# Patient Record
Sex: FEMALE | Race: WHITE | NOT HISPANIC OR LATINO | URBAN - METROPOLITAN AREA
[De-identification: names, ages, dates, MRNs, and addresses within clinical notes are randomized per-mention and may not be internally consistent; named-entity substitution may affect disease eponyms.]

---

## 2017-01-03 ENCOUNTER — COMMUNICATION - HEALTHEAST (OUTPATIENT)
Dept: FAMILY MEDICINE | Facility: CLINIC | Age: 67
End: 2017-01-03

## 2017-01-03 DIAGNOSIS — R52 PAIN: ICD-10-CM

## 2017-02-01 ENCOUNTER — COMMUNICATION - HEALTHEAST (OUTPATIENT)
Dept: FAMILY MEDICINE | Facility: CLINIC | Age: 67
End: 2017-02-01

## 2017-02-01 DIAGNOSIS — R52 PAIN: ICD-10-CM

## 2017-03-02 ENCOUNTER — COMMUNICATION - HEALTHEAST (OUTPATIENT)
Dept: FAMILY MEDICINE | Facility: CLINIC | Age: 67
End: 2017-03-02

## 2017-03-02 DIAGNOSIS — R52 PAIN: ICD-10-CM

## 2017-04-04 ENCOUNTER — COMMUNICATION - HEALTHEAST (OUTPATIENT)
Dept: FAMILY MEDICINE | Facility: CLINIC | Age: 67
End: 2017-04-04

## 2017-04-04 DIAGNOSIS — R52 PAIN: ICD-10-CM

## 2017-04-13 ENCOUNTER — COMMUNICATION - HEALTHEAST (OUTPATIENT)
Dept: FAMILY MEDICINE | Facility: CLINIC | Age: 67
End: 2017-04-13

## 2017-04-13 DIAGNOSIS — E78.5 HYPERLIPIDEMIA: ICD-10-CM

## 2017-04-18 ENCOUNTER — COMMUNICATION - HEALTHEAST (OUTPATIENT)
Dept: FAMILY MEDICINE | Facility: CLINIC | Age: 67
End: 2017-04-18

## 2017-04-19 ENCOUNTER — COMMUNICATION - HEALTHEAST (OUTPATIENT)
Dept: FAMILY MEDICINE | Facility: CLINIC | Age: 67
End: 2017-04-19

## 2017-04-19 DIAGNOSIS — R52 PAIN: ICD-10-CM

## 2017-04-20 ENCOUNTER — COMMUNICATION - HEALTHEAST (OUTPATIENT)
Dept: FAMILY MEDICINE | Facility: CLINIC | Age: 67
End: 2017-04-20

## 2017-05-08 ENCOUNTER — COMMUNICATION - HEALTHEAST (OUTPATIENT)
Dept: FAMILY MEDICINE | Facility: CLINIC | Age: 67
End: 2017-05-08

## 2017-05-08 DIAGNOSIS — I10 HYPERTENSION: ICD-10-CM

## 2017-05-08 DIAGNOSIS — R52 PAIN: ICD-10-CM

## 2017-06-08 ENCOUNTER — COMMUNICATION - HEALTHEAST (OUTPATIENT)
Dept: FAMILY MEDICINE | Facility: CLINIC | Age: 67
End: 2017-06-08

## 2017-06-08 DIAGNOSIS — R52 PAIN: ICD-10-CM

## 2017-06-08 DIAGNOSIS — I10 ESSENTIAL HYPERTENSION: ICD-10-CM

## 2017-06-12 ENCOUNTER — COMMUNICATION - HEALTHEAST (OUTPATIENT)
Dept: FAMILY MEDICINE | Facility: CLINIC | Age: 67
End: 2017-06-12

## 2017-06-12 ENCOUNTER — OFFICE VISIT - HEALTHEAST (OUTPATIENT)
Dept: FAMILY MEDICINE | Facility: CLINIC | Age: 67
End: 2017-06-12

## 2017-06-12 DIAGNOSIS — R52 PAIN: ICD-10-CM

## 2017-06-12 ASSESSMENT — MIFFLIN-ST. JEOR: SCORE: 1049.32

## 2017-06-13 ENCOUNTER — COMMUNICATION - HEALTHEAST (OUTPATIENT)
Dept: FAMILY MEDICINE | Facility: CLINIC | Age: 67
End: 2017-06-13

## 2017-06-13 DIAGNOSIS — R52 PAIN: ICD-10-CM

## 2017-06-14 ENCOUNTER — COMMUNICATION - HEALTHEAST (OUTPATIENT)
Dept: FAMILY MEDICINE | Facility: CLINIC | Age: 67
End: 2017-06-14

## 2017-06-14 DIAGNOSIS — R52 PAIN: ICD-10-CM

## 2017-07-04 ENCOUNTER — COMMUNICATION - HEALTHEAST (OUTPATIENT)
Dept: FAMILY MEDICINE | Facility: CLINIC | Age: 67
End: 2017-07-04

## 2017-07-04 DIAGNOSIS — E78.5 HYPERLIPIDEMIA: ICD-10-CM

## 2017-07-07 ENCOUNTER — COMMUNICATION - HEALTHEAST (OUTPATIENT)
Dept: NURSING | Facility: CLINIC | Age: 67
End: 2017-07-07

## 2017-07-13 ENCOUNTER — RECORDS - HEALTHEAST (OUTPATIENT)
Dept: GENERAL RADIOLOGY | Facility: CLINIC | Age: 67
End: 2017-07-13

## 2017-07-13 ENCOUNTER — OFFICE VISIT - HEALTHEAST (OUTPATIENT)
Dept: FAMILY MEDICINE | Facility: CLINIC | Age: 67
End: 2017-07-13

## 2017-07-13 DIAGNOSIS — Z79.899 CONTROLLED SUBSTANCE AGREEMENT SIGNED: ICD-10-CM

## 2017-07-13 DIAGNOSIS — E78.5 HYPERLIPIDEMIA: ICD-10-CM

## 2017-07-13 DIAGNOSIS — E11.9 DIET-CONTROLLED TYPE 2 DIABETES MELLITUS (H): ICD-10-CM

## 2017-07-13 DIAGNOSIS — M81.0 OSTEOPOROSIS: ICD-10-CM

## 2017-07-13 DIAGNOSIS — F17.200 TOBACCO USE DISORDER: ICD-10-CM

## 2017-07-13 DIAGNOSIS — I10 ESSENTIAL HYPERTENSION: ICD-10-CM

## 2017-07-13 DIAGNOSIS — M79.674 PAIN IN RIGHT TOE(S): ICD-10-CM

## 2017-07-13 DIAGNOSIS — S92.912A TOE FRACTURE, LEFT: ICD-10-CM

## 2017-07-13 DIAGNOSIS — E11.9 TYPE 2 DIABETES MELLITUS WITHOUT COMPLICATION (H): ICD-10-CM

## 2017-07-13 DIAGNOSIS — R52 PAIN: ICD-10-CM

## 2017-07-13 DIAGNOSIS — M79.674 PAIN OF TOE OF RIGHT FOOT: ICD-10-CM

## 2017-07-13 LAB — HBA1C MFR BLD: 5.9 % (ref 3.5–6)

## 2017-07-13 ASSESSMENT — MIFFLIN-ST. JEOR: SCORE: 1044.78

## 2017-07-13 NOTE — ASSESSMENT & PLAN NOTE
Assessment of blood sugar control: Excellent control with alone.  Lost a lot of weight shortly after onset did A1c 5.9  Lab Results   Component Value Date    HGBA1C 6.1 (H) 09/29/2016     Diabetes medication: none- diet  Statin medication (>40 or ^CVD Risk)- yes pravastatin  At blood pressure goal (JNC 8 <140/90 all ages): yes  Lab Results   Component Value Date    MICROALBUR <0.50 04/08/2015     Ace/arb indicated and taking? No- lis lead to cough  Low dose ASA? (indicated for most men> 50, most women > 60 if any other card RF - no- will start  Up to date with yearly dilated retina eval? yes    Plan no change, continue diet  Follow-up 6 month

## 2017-07-19 ENCOUNTER — RECORDS - HEALTHEAST (OUTPATIENT)
Dept: BONE DENSITY | Facility: CLINIC | Age: 67
End: 2017-07-19

## 2017-07-19 ENCOUNTER — RECORDS - HEALTHEAST (OUTPATIENT)
Dept: ADMINISTRATIVE | Facility: OTHER | Age: 67
End: 2017-07-19

## 2017-07-19 DIAGNOSIS — M81.0 AGE-RELATED OSTEOPOROSIS WITHOUT CURRENT PATHOLOGICAL FRACTURE: ICD-10-CM

## 2017-07-20 ENCOUNTER — AMBULATORY - HEALTHEAST (OUTPATIENT)
Dept: FAMILY MEDICINE | Facility: CLINIC | Age: 67
End: 2017-07-20

## 2017-07-20 ENCOUNTER — COMMUNICATION - HEALTHEAST (OUTPATIENT)
Dept: FAMILY MEDICINE | Facility: CLINIC | Age: 67
End: 2017-07-20

## 2017-07-20 DIAGNOSIS — M81.0 OSTEOPOROSIS: ICD-10-CM

## 2017-07-24 ENCOUNTER — COMMUNICATION - HEALTHEAST (OUTPATIENT)
Dept: NURSING | Facility: CLINIC | Age: 67
End: 2017-07-24

## 2017-08-02 ENCOUNTER — COMMUNICATION - HEALTHEAST (OUTPATIENT)
Dept: FAMILY MEDICINE | Facility: CLINIC | Age: 67
End: 2017-08-02

## 2017-08-02 DIAGNOSIS — I10 HYPERTENSION: ICD-10-CM

## 2017-09-16 ENCOUNTER — COMMUNICATION - HEALTHEAST (OUTPATIENT)
Dept: FAMILY MEDICINE | Facility: CLINIC | Age: 67
End: 2017-09-16

## 2017-09-27 ENCOUNTER — COMMUNICATION - HEALTHEAST (OUTPATIENT)
Dept: FAMILY MEDICINE | Facility: CLINIC | Age: 67
End: 2017-09-27

## 2017-09-27 DIAGNOSIS — E78.5 HYPERLIPIDEMIA: ICD-10-CM

## 2017-10-18 ENCOUNTER — COMMUNICATION - HEALTHEAST (OUTPATIENT)
Dept: FAMILY MEDICINE | Facility: CLINIC | Age: 67
End: 2017-10-18

## 2017-10-18 DIAGNOSIS — R52 PAIN: ICD-10-CM

## 2017-11-24 ENCOUNTER — COMMUNICATION - HEALTHEAST (OUTPATIENT)
Dept: NURSING | Facility: CLINIC | Age: 67
End: 2017-11-24

## 2017-11-28 ENCOUNTER — COMMUNICATION - HEALTHEAST (OUTPATIENT)
Dept: FAMILY MEDICINE | Facility: CLINIC | Age: 67
End: 2017-11-28

## 2017-11-28 DIAGNOSIS — R52 PAIN: ICD-10-CM

## 2017-12-29 ENCOUNTER — COMMUNICATION - HEALTHEAST (OUTPATIENT)
Dept: FAMILY MEDICINE | Facility: CLINIC | Age: 67
End: 2017-12-29

## 2017-12-29 DIAGNOSIS — R52 PAIN: ICD-10-CM

## 2018-01-01 ENCOUNTER — COMMUNICATION - HEALTHEAST (OUTPATIENT)
Dept: NURSING | Facility: CLINIC | Age: 68
End: 2018-01-01

## 2018-01-01 ENCOUNTER — COMMUNICATION - HEALTHEAST (OUTPATIENT)
Dept: FAMILY MEDICINE | Facility: CLINIC | Age: 68
End: 2018-01-01

## 2018-01-01 ENCOUNTER — OFFICE VISIT - HEALTHEAST (OUTPATIENT)
Dept: FAMILY MEDICINE | Facility: CLINIC | Age: 68
End: 2018-01-01

## 2018-01-01 ENCOUNTER — AMBULATORY - HEALTHEAST (OUTPATIENT)
Dept: FAMILY MEDICINE | Facility: CLINIC | Age: 68
End: 2018-01-01

## 2018-01-01 ENCOUNTER — RECORDS - HEALTHEAST (OUTPATIENT)
Dept: GENERAL RADIOLOGY | Facility: CLINIC | Age: 68
End: 2018-01-01

## 2018-01-01 ENCOUNTER — RECORDS - HEALTHEAST (OUTPATIENT)
Dept: MAMMOGRAPHY | Facility: CLINIC | Age: 68
End: 2018-01-01

## 2018-01-01 ENCOUNTER — COMMUNICATION - HEALTHEAST (OUTPATIENT)
Dept: TELEHEALTH | Facility: CLINIC | Age: 68
End: 2018-01-01

## 2018-01-01 ENCOUNTER — COMMUNICATION - HEALTHEAST (OUTPATIENT)
Dept: SCHEDULING | Facility: CLINIC | Age: 68
End: 2018-01-01

## 2018-01-01 ENCOUNTER — RECORDS - HEALTHEAST (OUTPATIENT)
Dept: ADMINISTRATIVE | Facility: OTHER | Age: 68
End: 2018-01-01

## 2018-01-01 ENCOUNTER — SURGERY - HEALTHEAST (OUTPATIENT)
Dept: SURGERY | Facility: AMBULATORY SURGERY CENTER | Age: 68
End: 2018-01-01

## 2018-01-01 ENCOUNTER — HOSPITAL ENCOUNTER (OUTPATIENT)
Dept: MRI IMAGING | Facility: CLINIC | Age: 68
Discharge: HOME | End: 2018-07-13
Attending: FAMILY MEDICINE

## 2018-01-01 ENCOUNTER — ANESTHESIA - HEALTHEAST (OUTPATIENT)
Dept: SURGERY | Facility: AMBULATORY SURGERY CENTER | Age: 68
End: 2018-01-01

## 2018-01-01 ENCOUNTER — AMBULATORY - HEALTHEAST (OUTPATIENT)
Dept: SURGERY | Facility: CLINIC | Age: 68
End: 2018-01-01

## 2018-01-01 DIAGNOSIS — Z12.31 VISIT FOR SCREENING MAMMOGRAM: ICD-10-CM

## 2018-01-01 DIAGNOSIS — M75.82 ROTATOR CUFF TENDINITIS, LEFT: ICD-10-CM

## 2018-01-01 DIAGNOSIS — R52 PAIN: ICD-10-CM

## 2018-01-01 DIAGNOSIS — Z98.890 POSTOPERATIVE NAUSEA: ICD-10-CM

## 2018-01-01 DIAGNOSIS — Z12.31 ENCOUNTER FOR SCREENING MAMMOGRAM FOR MALIGNANT NEOPLASM OF BREAST: ICD-10-CM

## 2018-01-01 DIAGNOSIS — E87.6 HYPOKALEMIA: ICD-10-CM

## 2018-01-01 DIAGNOSIS — E11.9 DIET-CONTROLLED TYPE 2 DIABETES MELLITUS (H): ICD-10-CM

## 2018-01-01 DIAGNOSIS — R51.9 HEADACHE: ICD-10-CM

## 2018-01-01 DIAGNOSIS — M16.9 DEGENERATIVE JOINT DISEASE (DJD) OF HIP: ICD-10-CM

## 2018-01-01 DIAGNOSIS — Z00.00 HEALTH CARE MAINTENANCE: ICD-10-CM

## 2018-01-01 DIAGNOSIS — E78.5 HYPERLIPIDEMIA: ICD-10-CM

## 2018-01-01 DIAGNOSIS — Z79.899 CONTROLLED SUBSTANCE AGREEMENT SIGNED: ICD-10-CM

## 2018-01-01 DIAGNOSIS — E78.00 PURE HYPERCHOLESTEROLEMIA: ICD-10-CM

## 2018-01-01 DIAGNOSIS — L65.9 HAIR LOSS: ICD-10-CM

## 2018-01-01 DIAGNOSIS — G47.00 PERSISTENT INSOMNIA: ICD-10-CM

## 2018-01-01 DIAGNOSIS — R52 PAIN, UNSPECIFIED: ICD-10-CM

## 2018-01-01 DIAGNOSIS — R51.9 ACUTE INTRACTABLE HEADACHE, UNSPECIFIED HEADACHE TYPE: ICD-10-CM

## 2018-01-01 DIAGNOSIS — M79.10 MYALGIA: ICD-10-CM

## 2018-01-01 DIAGNOSIS — R70.0 ELEVATED SED RATE: ICD-10-CM

## 2018-01-01 DIAGNOSIS — I10 HYPERTENSION: ICD-10-CM

## 2018-01-01 DIAGNOSIS — R11.0 POSTOPERATIVE NAUSEA: ICD-10-CM

## 2018-01-01 LAB
CK SERPL-CCNC: 71 U/L (ref 30–190)
ERYTHROCYTE [SEDIMENTATION RATE] IN BLOOD BY WESTERGREN METHOD: 29 MM/HR (ref 0–20)
HBA1C MFR BLD: 6 % (ref 3.5–6)
HGB BLD-MCNC: 14.8 G/DL (ref 12–16)
POTASSIUM BLD-SCNC: 3.9 MMOL/L (ref 3.5–5)
POTASSIUM BLD-SCNC: 3.9 MMOL/L (ref 3.5–5)
TSH SERPL DL<=0.005 MIU/L-ACNC: 2.02 UIU/ML (ref 0.3–5)

## 2018-01-01 ASSESSMENT — MIFFLIN-ST. JEOR
SCORE: 1031.18
SCORE: 1031.18

## 2018-01-03 ENCOUNTER — COMMUNICATION - HEALTHEAST (OUTPATIENT)
Dept: NURSING | Facility: CLINIC | Age: 68
End: 2018-01-03

## 2018-02-02 ENCOUNTER — COMMUNICATION - HEALTHEAST (OUTPATIENT)
Dept: FAMILY MEDICINE | Facility: CLINIC | Age: 68
End: 2018-02-02

## 2018-02-02 DIAGNOSIS — R52 PAIN: ICD-10-CM

## 2018-02-09 ENCOUNTER — OFFICE VISIT - HEALTHEAST (OUTPATIENT)
Dept: FAMILY MEDICINE | Facility: CLINIC | Age: 68
End: 2018-02-09

## 2018-02-09 ENCOUNTER — COMMUNICATION - HEALTHEAST (OUTPATIENT)
Dept: TELEHEALTH | Facility: CLINIC | Age: 68
End: 2018-02-09

## 2018-02-09 DIAGNOSIS — E11.9 DIET-CONTROLLED TYPE 2 DIABETES MELLITUS (H): ICD-10-CM

## 2018-02-09 DIAGNOSIS — F17.200 TOBACCO USE DISORDER: ICD-10-CM

## 2018-02-09 DIAGNOSIS — H61.21 IMPACTED CERUMEN OF RIGHT EAR: ICD-10-CM

## 2018-02-09 DIAGNOSIS — Z01.818 PREOP EXAMINATION: ICD-10-CM

## 2018-02-09 DIAGNOSIS — M16.9 DEGENERATIVE JOINT DISEASE (DJD) OF HIP: ICD-10-CM

## 2018-02-09 DIAGNOSIS — I10 ESSENTIAL HYPERTENSION: ICD-10-CM

## 2018-02-09 LAB
ALT SERPL W P-5'-P-CCNC: 12 U/L (ref 0–45)
ATRIAL RATE - MUSE: 68 BPM
CREAT SERPL-MCNC: 0.88 MG/DL (ref 0.6–1.1)
DIASTOLIC BLOOD PRESSURE - MUSE: NORMAL MMHG
GFR SERPL CREATININE-BSD FRML MDRD: >60 ML/MIN/1.73M2
HBA1C MFR BLD: 6.2 % (ref 3.5–6)
INTERPRETATION ECG - MUSE: NORMAL
LDLC SERPL CALC-MCNC: 91 MG/DL
P AXIS - MUSE: 31 DEGREES
POTASSIUM BLD-SCNC: 3.6 MMOL/L (ref 3.5–5)
PR INTERVAL - MUSE: 162 MS
QRS DURATION - MUSE: 72 MS
QT - MUSE: 396 MS
QTC - MUSE: 421 MS
R AXIS - MUSE: -5 DEGREES
SYSTOLIC BLOOD PRESSURE - MUSE: NORMAL MMHG
T AXIS - MUSE: 51 DEGREES
VENTRICULAR RATE- MUSE: 68 BPM

## 2018-02-09 ASSESSMENT — MIFFLIN-ST. JEOR: SCORE: 1028.34

## 2018-02-20 ENCOUNTER — COMMUNICATION - HEALTHEAST (OUTPATIENT)
Dept: FAMILY MEDICINE | Facility: CLINIC | Age: 68
End: 2018-02-20

## 2018-03-08 ENCOUNTER — COMMUNICATION - HEALTHEAST (OUTPATIENT)
Dept: FAMILY MEDICINE | Facility: CLINIC | Age: 68
End: 2018-03-08

## 2018-03-08 DIAGNOSIS — R52 PAIN: ICD-10-CM

## 2018-03-21 ENCOUNTER — COMMUNICATION - HEALTHEAST (OUTPATIENT)
Dept: FAMILY MEDICINE | Facility: CLINIC | Age: 68
End: 2018-03-21

## 2018-03-28 ENCOUNTER — COMMUNICATION - HEALTHEAST (OUTPATIENT)
Dept: NURSING | Facility: CLINIC | Age: 68
End: 2018-03-28

## 2018-04-11 ENCOUNTER — COMMUNICATION - HEALTHEAST (OUTPATIENT)
Dept: FAMILY MEDICINE | Facility: CLINIC | Age: 68
End: 2018-04-11

## 2018-04-11 DIAGNOSIS — R52 PAIN: ICD-10-CM

## 2018-04-12 ENCOUNTER — COMMUNICATION - HEALTHEAST (OUTPATIENT)
Dept: FAMILY MEDICINE | Facility: CLINIC | Age: 68
End: 2018-04-12

## 2018-04-12 DIAGNOSIS — R52 PAIN: ICD-10-CM

## 2018-04-20 ENCOUNTER — COMMUNICATION - HEALTHEAST (OUTPATIENT)
Dept: NURSING | Facility: CLINIC | Age: 68
End: 2018-04-20

## 2018-04-27 ENCOUNTER — AMBULATORY - HEALTHEAST (OUTPATIENT)
Dept: LAB | Facility: CLINIC | Age: 68
End: 2018-04-27

## 2018-04-27 DIAGNOSIS — R51.9 FACIAL PAIN: ICD-10-CM

## 2018-04-27 LAB
BASOPHILS # BLD AUTO: 0.1 THOU/UL (ref 0–0.2)
BASOPHILS NFR BLD AUTO: 1 % (ref 0–2)
EOSINOPHIL # BLD AUTO: 0.2 THOU/UL (ref 0–0.4)
EOSINOPHIL NFR BLD AUTO: 2 % (ref 0–6)
ERYTHROCYTE [DISTWIDTH] IN BLOOD BY AUTOMATED COUNT: 12.9 % (ref 11–14.5)
ERYTHROCYTE [SEDIMENTATION RATE] IN BLOOD BY WESTERGREN METHOD: 13 MM/HR (ref 0–20)
HCT VFR BLD AUTO: 40.3 % (ref 35–47)
HGB BLD-MCNC: 13.8 G/DL (ref 12–16)
LYMPHOCYTES # BLD AUTO: 3.1 THOU/UL (ref 0.8–4.4)
LYMPHOCYTES NFR BLD AUTO: 28 % (ref 20–40)
MCH RBC QN AUTO: 32.9 PG (ref 27–34)
MCHC RBC AUTO-ENTMCNC: 34.2 G/DL (ref 32–36)
MCV RBC AUTO: 96 FL (ref 80–100)
MONOCYTES # BLD AUTO: 0.9 THOU/UL (ref 0–0.9)
MONOCYTES NFR BLD AUTO: 8 % (ref 2–10)
NEUTROPHILS # BLD AUTO: 6.8 THOU/UL (ref 2–7.7)
NEUTROPHILS NFR BLD AUTO: 62 % (ref 50–70)
PLATELET # BLD AUTO: 277 THOU/UL (ref 140–440)
PMV BLD AUTO: 9.2 FL (ref 8.5–12.5)
RBC # BLD AUTO: 4.2 MILL/UL (ref 3.8–5.4)
WBC: 11.2 THOU/UL (ref 4–11)

## 2018-05-02 ENCOUNTER — AMBULATORY - HEALTHEAST (OUTPATIENT)
Dept: LAB | Facility: CLINIC | Age: 68
End: 2018-05-02

## 2018-05-02 DIAGNOSIS — R51.9 FACIAL PAIN: ICD-10-CM

## 2018-05-02 LAB — C REACTIVE PROTEIN LHE: <0.1 MG/DL (ref 0–0.8)

## 2018-05-18 ENCOUNTER — COMMUNICATION - HEALTHEAST (OUTPATIENT)
Dept: NURSING | Facility: CLINIC | Age: 68
End: 2018-05-18

## 2018-07-12 NOTE — ASSESSMENT & PLAN NOTE
X-ray negative  Patient reports physical therapy previously ineffective  Discussed option of injection and limited benefit  Patient accepted  Subacromial corticosteroid injection of left shoulder  Discussed risk and benefits.  -Risks being but not limited to to infection, bleeding and unlikely pneumothorax  -Benefit being limited to no more than 4 weeks of reduced shoulder pain.    Patient agreed/requested procedure  Identified region 1 cm inferior, 1 cm medial to posterior angle of the acromion  Prepped this with alcohol.  27-gauge 1-1/4 needle advanced parallel to the acromion, care taken not to drive the needle inferior  Injected 1 cc depomedrol 40 + 1 cc lidocaine  Patient tolerated the procedure well

## 2018-07-12 NOTE — ASSESSMENT & PLAN NOTE
Bitemporal,  No red flags but does not fit clear pattern  Exception to no red flags is age  Brisk reflexes  MRI

## 2018-07-12 NOTE — ASSESSMENT & PLAN NOTE
Some headache, worked up for temporal arteritis by ophthalmology, at least it sounds like that.  Await CK and ESR.  On mid dose pravastatin

## 2018-07-12 NOTE — ASSESSMENT & PLAN NOTE
Assessment of blood sugar control: Good  Lab Results   Component Value Date    HGBA1C 6.0 07/12/2018     Diabetes medication: None  Statin medication (>40 or ^CVD Risk)-pravastatin 40  At blood pressure goal (JNC 8 <140/90 all ages): Yes  Lab Results   Component Value Date    MICROALBUR 0.84 07/13/2017     Ace/arb indicated and taking?  No, intolerant, on amlodipine 10  Low dose ASA? (indicated for most men> 50, most women > 60 if any other card RF yes  Up to date with yearly dilated retina eval?  Yes    Plan: No change  Follow-up: 6 month

## 2018-07-12 NOTE — ASSESSMENT & PLAN NOTE
Inadequate time today to address substance agreement    Continue 5 tramadol per day.  Discussed possibility of increasing this due to uptake in pain lately  Refill ordered

## 2018-10-25 NOTE — ASSESSMENT & PLAN NOTE
Currently following with [facility] clinic, apparently temporal arteritis has been ruled out by combination of ultrasound and biopsy.  Reviewed note from 9/11.  More recent note not available but it sounds like she is being treated for chronic daily headache.  I am somewhat reluctant to add tramadol which she is been on in the past for this but I do not think, given her narcotic contract and good pain relief that we have a choice.  Recommend she restart her amitriptyline at low dose.  Reviewed recommendations for chronic daily headache including regular exercise, smoking cessation, adequate fluid intake.  So sleep hygiene.

## 2018-10-25 NOTE — ASSESSMENT & PLAN NOTE
Found, etiology not entirely clear, not on diuretics.  Has been on corticosteroids but not currently.  Potassium chloride 20 mEq twice daily prescribed by neurologist, recheck basic metabolic profile today

## 2018-10-25 NOTE — ASSESSMENT & PLAN NOTE
Mistakenly prescribed 150 tablets today which would be about enough for a month and two thirds.  Intention was 1 full month with 2 refills.  Will contact her to discuss.

## 2018-10-25 NOTE — ASSESSMENT & PLAN NOTE
Discussed option of CBT, patient declined  Not getting good results from amitriptyline  Complaining about polypharmacy already, I do not think addition of medication is good option

## 2019-01-01 ENCOUNTER — RECORDS - HEALTHEAST (OUTPATIENT)
Dept: ADMINISTRATIVE | Facility: OTHER | Age: 69
End: 2019-01-01

## 2019-01-01 ENCOUNTER — OFFICE VISIT - HEALTHEAST (OUTPATIENT)
Dept: FAMILY MEDICINE | Facility: CLINIC | Age: 69
End: 2019-01-01

## 2019-01-01 ENCOUNTER — COMMUNICATION - HEALTHEAST (OUTPATIENT)
Dept: FAMILY MEDICINE | Facility: CLINIC | Age: 69
End: 2019-01-01

## 2019-01-01 ENCOUNTER — COMMUNICATION - HEALTHEAST (OUTPATIENT)
Dept: NURSING | Facility: CLINIC | Age: 69
End: 2019-01-01

## 2019-01-01 ENCOUNTER — RECORDS - HEALTHEAST (OUTPATIENT)
Dept: GENERAL RADIOLOGY | Facility: CLINIC | Age: 69
End: 2019-01-01

## 2019-01-01 ENCOUNTER — COMMUNICATION - HEALTHEAST (OUTPATIENT)
Dept: PHYSICAL THERAPY | Facility: REHABILITATION | Age: 69
End: 2019-01-01

## 2019-01-01 ENCOUNTER — OFFICE VISIT - HEALTHEAST (OUTPATIENT)
Dept: PHYSICAL THERAPY | Facility: REHABILITATION | Age: 69
End: 2019-01-01

## 2019-01-01 ENCOUNTER — HOSPITAL ENCOUNTER (OUTPATIENT)
Dept: INTERVENTIONAL RADIOLOGY/VASCULAR | Facility: HOSPITAL | Age: 69
Discharge: HOME | End: 2019-05-03
Attending: INTERNAL MEDICINE

## 2019-01-01 ENCOUNTER — AMBULATORY - HEALTHEAST (OUTPATIENT)
Dept: LAB | Facility: CLINIC | Age: 69
End: 2019-01-01

## 2019-01-01 ENCOUNTER — COMMUNICATION - HEALTHEAST (OUTPATIENT)
Dept: SCHEDULING | Facility: CLINIC | Age: 69
End: 2019-01-01

## 2019-01-01 ENCOUNTER — HOME CARE/HOSPICE - HEALTHEAST (OUTPATIENT)
Dept: HOSPICE | Facility: HOSPICE | Age: 69
End: 2019-01-01

## 2019-01-01 ENCOUNTER — HOSPITAL ENCOUNTER (OUTPATIENT)
Dept: ULTRASOUND IMAGING | Facility: CLINIC | Age: 69
Discharge: HOME | End: 2019-03-12
Attending: INTERNAL MEDICINE

## 2019-01-01 ENCOUNTER — AMBULATORY - HEALTHEAST (OUTPATIENT)
Dept: FAMILY MEDICINE | Facility: CLINIC | Age: 69
End: 2019-01-01

## 2019-01-01 ENCOUNTER — COMMUNICATION - HEALTHEAST (OUTPATIENT)
Dept: TELEHEALTH | Facility: CLINIC | Age: 69
End: 2019-01-01

## 2019-01-01 DIAGNOSIS — M16.9 OSTEOARTHRITIS OF HIP, UNSPECIFIED LATERALITY, UNSPECIFIED OSTEOARTHRITIS TYPE: ICD-10-CM

## 2019-01-01 DIAGNOSIS — E83.52 HYPERCALCEMIA: ICD-10-CM

## 2019-01-01 DIAGNOSIS — E87.6 HYPOKALEMIA: ICD-10-CM

## 2019-01-01 DIAGNOSIS — E11.9 DIET-CONTROLLED TYPE 2 DIABETES MELLITUS (H): ICD-10-CM

## 2019-01-01 DIAGNOSIS — I87.2 VENOUS STASIS DERMATITIS OF BOTH LOWER EXTREMITIES: ICD-10-CM

## 2019-01-01 DIAGNOSIS — G89.29 BILATERAL CHRONIC KNEE PAIN: ICD-10-CM

## 2019-01-01 DIAGNOSIS — M25.551 HIP PAIN, RIGHT: ICD-10-CM

## 2019-01-01 DIAGNOSIS — M25.551 ACUTE RIGHT HIP PAIN: ICD-10-CM

## 2019-01-01 DIAGNOSIS — N17.9 AKI (ACUTE KIDNEY INJURY) (H): ICD-10-CM

## 2019-01-01 DIAGNOSIS — M75.82 ROTATOR CUFF TENDINITIS, LEFT: ICD-10-CM

## 2019-01-01 DIAGNOSIS — N18.30 STAGE 3 CHRONIC KIDNEY DISEASE (H): ICD-10-CM
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
## 2019-01-01 DIAGNOSIS — M25.551 PAIN IN RIGHT HIP: ICD-10-CM

## 2019-01-01 DIAGNOSIS — M81.0 OSTEOPOROSIS, UNSPECIFIED OSTEOPOROSIS TYPE, UNSPECIFIED PATHOLOGICAL FRACTURE PRESENCE: ICD-10-CM

## 2019-01-01 DIAGNOSIS — I10 ESSENTIAL HYPERTENSION: ICD-10-CM

## 2019-01-01 DIAGNOSIS — D72.829 LEUKOCYTOSIS, UNSPECIFIED TYPE: ICD-10-CM

## 2019-01-01 DIAGNOSIS — R80.9 PROTEINURIA, UNSPECIFIED TYPE: ICD-10-CM

## 2019-01-01 DIAGNOSIS — M25.561 ACUTE PAIN OF RIGHT KNEE: ICD-10-CM

## 2019-01-01 DIAGNOSIS — M25.569 PAIN IN UNSPECIFIED KNEE: ICD-10-CM

## 2019-01-01 DIAGNOSIS — R26.81 UNSTEADINESS ON FEET: ICD-10-CM

## 2019-01-01 DIAGNOSIS — R41.3 MEMORY CHANGES: ICD-10-CM

## 2019-01-01 DIAGNOSIS — M25.561 PAIN IN RIGHT KNEE: ICD-10-CM
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
## 2019-01-01 DIAGNOSIS — M25.569 ANTERIOR KNEE PAIN, UNSPECIFIED LATERALITY: ICD-10-CM

## 2019-01-01 DIAGNOSIS — R53.83 FATIGUE, UNSPECIFIED TYPE: ICD-10-CM

## 2019-01-01 DIAGNOSIS — M25.561 BILATERAL CHRONIC KNEE PAIN: ICD-10-CM

## 2019-01-01 DIAGNOSIS — M25.562 BILATERAL CHRONIC KNEE PAIN: ICD-10-CM

## 2019-01-01 DIAGNOSIS — R26.9 ABNORMAL GAIT: ICD-10-CM

## 2019-01-01 LAB
1,25(OH)2D SERPL-MCNC: 52.3 PG/ML (ref 19.9–79.3)
25(OH)D3 SERPL-MCNC: 29.3 NG/ML (ref 30–80)
25(OH)D3 SERPL-MCNC: 31.6 NG/ML (ref 30–80)
ALBUMIN PERCENT: 63.2 % (ref 51–67)
ALBUMIN SERPL ELPH-MCNC: 4.4 G/DL (ref 3.2–4.7)
ALBUMIN SERPL-MCNC: 3.8 G/DL (ref 3.5–5)
ALBUMIN UR-MCNC: ABNORMAL MG/DL
ALBUMIN UR-MCNC: ABNORMAL MG/DL
ALP SERPL-CCNC: 78 U/L (ref 45–120)
ALPHA 1 PERCENT: 2.8 % (ref 2–4)
ALPHA 2 PERCENT: 15.1 % (ref 5–13)
ALPHA1 GLOB SERPL ELPH-MCNC: 0.2 G/DL (ref 0.1–0.3)
ALPHA2 GLOB SERPL ELPH-MCNC: 1.1 G/DL (ref 0.4–0.9)
ALT SERPL W P-5'-P-CCNC: 10 U/L (ref 0–45)
ALT SERPL W P-5'-P-CCNC: 16 U/L (ref 0–45)
ANION GAP SERPL CALCULATED.3IONS-SCNC: 10 MMOL/L (ref 5–18)
ANION GAP SERPL CALCULATED.3IONS-SCNC: 18 MMOL/L (ref 5–18)
APPEARANCE UR: CLEAR
APPEARANCE UR: CLEAR
AST SERPL W P-5'-P-CCNC: 27 U/L (ref 0–40)
B-GLOBULIN SERPL ELPH-MCNC: 0.9 G/DL (ref 0.7–1.2)
BACTERIA #/AREA URNS HPF: ABNORMAL HPF
BASOPHILS # BLD AUTO: 0.1 THOU/UL (ref 0–0.2)
BASOPHILS # BLD AUTO: 0.1 THOU/UL (ref 0–0.2)
BASOPHILS NFR BLD AUTO: 1 % (ref 0–2)
BASOPHILS NFR BLD AUTO: 1 % (ref 0–2)
BETA PERCENT: 12.8 % (ref 10–17)
BILIRUB SERPL-MCNC: 0.3 MG/DL (ref 0–1)
BILIRUB UR QL STRIP: ABNORMAL
BILIRUB UR QL STRIP: NEGATIVE
BUN SERPL-MCNC: 15 MG/DL (ref 8–22)
BUN SERPL-MCNC: 21 MG/DL (ref 8–22)
CALCIUM SERPL-MCNC: 10.7 MG/DL (ref 8.5–10.5)
CALCIUM SERPL-MCNC: 11 MG/DL (ref 8.5–10.5)
CALCIUM, IONIZED MEASURED: 1.32 MMOL/L (ref 1.11–1.3)
CHLORIDE BLD-SCNC: 91 MMOL/L (ref 98–107)
CHLORIDE BLD-SCNC: 99 MMOL/L (ref 98–107)
CO2 SERPL-SCNC: 27 MMOL/L (ref 22–31)
CO2 SERPL-SCNC: 29 MMOL/L (ref 22–31)
COLOR UR AUTO: YELLOW
COLOR UR AUTO: YELLOW
CREAT SERPL-MCNC: 1.03 MG/DL (ref 0.6–1.1)
CREAT SERPL-MCNC: 1.76 MG/DL (ref 0.6–1.1)
CREAT UR-MCNC: 168.7 MG/DL
CREAT UR-MCNC: 170.8 MG/DL
EOSINOPHIL # BLD AUTO: 0 THOU/UL (ref 0–0.4)
EOSINOPHIL # BLD AUTO: 0.1 THOU/UL (ref 0–0.4)
EOSINOPHIL NFR BLD AUTO: 0 % (ref 0–6)
EOSINOPHIL NFR BLD AUTO: 1 % (ref 0–6)
ERYTHROCYTE [DISTWIDTH] IN BLOOD BY AUTOMATED COUNT: 12.8 % (ref 11–14.5)
ERYTHROCYTE [DISTWIDTH] IN BLOOD BY AUTOMATED COUNT: 13 % (ref 11–14.5)
FOLATE SERPL-MCNC: 8.4 NG/ML
GAMMA GLOB SERPL ELPH-MCNC: 0.4 G/DL (ref 0.6–1.4)
GAMMA GLOBULIN PERCENT: 6.1 % (ref 9–20)
GFR SERPL CREATININE-BSD FRML MDRD: 29 ML/MIN/1.73M2
GFR SERPL CREATININE-BSD FRML MDRD: 53 ML/MIN/1.73M2
GLUCOSE BLD-MCNC: 105 MG/DL (ref 70–125)
GLUCOSE BLD-MCNC: 136 MG/DL (ref 70–125)
GLUCOSE UR STRIP-MCNC: ABNORMAL MG/DL
GLUCOSE UR STRIP-MCNC: NEGATIVE MG/DL
HBA1C MFR BLD: 6.2 % (ref 3.5–6)
HBA1C MFR BLD: 6.5 % (ref 3.5–6)
HCT VFR BLD AUTO: 44.9 % (ref 35–47)
HCT VFR BLD AUTO: 46.6 % (ref 35–47)
HGB BLD-MCNC: 15.6 G/DL (ref 12–16)
HGB BLD-MCNC: 15.9 G/DL (ref 12–16)
HGB UR QL STRIP: NEGATIVE
HGB UR QL STRIP: NEGATIVE
ION CA PH 7.4: 1.29 MMOL/L (ref 1.11–1.3)
KETONES UR STRIP-MCNC: ABNORMAL MG/DL
KETONES UR STRIP-MCNC: NEGATIVE MG/DL
LDLC SERPL CALC-MCNC: 133 MG/DL
LEUKOCYTE ESTERASE UR QL STRIP: NEGATIVE
LEUKOCYTE ESTERASE UR QL STRIP: NEGATIVE
LYMPHOCYTES # BLD AUTO: 1.5 THOU/UL (ref 0.8–4.4)
LYMPHOCYTES # BLD AUTO: 2.3 THOU/UL (ref 0.8–4.4)
LYMPHOCYTES NFR BLD AUTO: 12 % (ref 20–40)
LYMPHOCYTES NFR BLD AUTO: 20 % (ref 20–40)
MCH RBC QN AUTO: 32.3 PG (ref 27–34)
MCH RBC QN AUTO: 32.7 PG (ref 27–34)
MCHC RBC AUTO-ENTMCNC: 34.1 G/DL (ref 32–36)
MCHC RBC AUTO-ENTMCNC: 34.7 G/DL (ref 32–36)
MCV RBC AUTO: 94 FL (ref 80–100)
MCV RBC AUTO: 95 FL (ref 80–100)
MICROALBUMIN UR-MCNC: 313.27 MG/DL (ref 0–1.99)
MICROALBUMIN/CREAT UR: 1834.1 MG/G
MONOCYTES # BLD AUTO: 0.6 THOU/UL (ref 0–0.9)
MONOCYTES # BLD AUTO: 1.2 THOU/UL (ref 0–0.9)
MONOCYTES NFR BLD AUTO: 10 % (ref 2–10)
MONOCYTES NFR BLD AUTO: 5 % (ref 2–10)
NEUTROPHILS # BLD AUTO: 8.6 THOU/UL (ref 2–7.7)
NEUTROPHILS # BLD AUTO: 9.2 THOU/UL (ref 2–7.7)
NEUTROPHILS NFR BLD AUTO: 74 % (ref 50–70)
NEUTROPHILS NFR BLD AUTO: 76 % (ref 50–70)
NITRATE UR QL: NEGATIVE
NITRATE UR QL: NEGATIVE
PATH ICD:: ABNORMAL
PATH ICD:: NORMAL
PH UR STRIP: 6 [PH] (ref 5–8)
PH UR STRIP: 8 [PH] (ref 5–8)
PH: 7.35 (ref 7.35–7.45)
PLATELET # BLD AUTO: 270 THOU/UL (ref 140–440)
PLATELET # BLD AUTO: 343 THOU/UL (ref 140–440)
PMV BLD AUTO: 9.4 FL (ref 8.5–12.5)
PMV BLD AUTO: 9.6 FL (ref 8.5–12.5)
POTASSIUM BLD-SCNC: 3.3 MMOL/L (ref 3.5–5)
POTASSIUM BLD-SCNC: 3.8 MMOL/L (ref 3.5–5)
PROT PATTERN SERPL ELPH-IMP: ABNORMAL
PROT PATTERN SERPL ELPH-IMP: NORMAL
PROT SERPL-MCNC: 7 G/DL (ref 6–8)
PROT SERPL-MCNC: 7 G/DL (ref 6–8)
PTH RELATED PROT SERPL-SCNC: <2 PMOL/L (ref 0–3.4)
PTH-INTACT SERPL-MCNC: 25 PG/ML (ref 10–86)
RBC # BLD AUTO: 4.77 MILL/UL (ref 3.8–5.4)
RBC # BLD AUTO: 4.92 MILL/UL (ref 3.8–5.4)
RBC #/AREA URNS AUTO: ABNORMAL HPF
REVIEWING PATHOLOGIST: ABNORMAL
REVIEWING PATHOLOGIST: NORMAL
SODIUM SERPL-SCNC: 136 MMOL/L (ref 136–145)
SODIUM SERPL-SCNC: 138 MMOL/L (ref 136–145)
SODIUM UR-SCNC: <20 MMOL/L
SP GR UR STRIP: 1.02 (ref 1–1.03)
SP GR UR STRIP: 1.02 (ref 1–1.03)
SQUAMOUS #/AREA URNS AUTO: ABNORMAL LPF
TOTAL PROTEIN RANDOM URINE MG/DL: 395 MG/DL
UROBILINOGEN UR STRIP-ACNC: ABNORMAL
UROBILINOGEN UR STRIP-ACNC: ABNORMAL
VIT B12 SERPL-MCNC: 334 PG/ML (ref 213–816)
WBC #/AREA URNS AUTO: ABNORMAL HPF
WBC: 11.8 THOU/UL (ref 4–11)
WBC: 12.1 THOU/UL (ref 4–11)

## 2019-01-01 ASSESSMENT — MIFFLIN-ST. JEOR: SCORE: 958.6

## 2019-01-10 NOTE — ASSESSMENT & PLAN NOTE
"Changes are minimal, mostly subjective but \"spaciness\" has been noticed by family members.  See  discussion last visit  Leading possibilities in my mind are the acute kidney injury and indeed, according to patient's friend, she had this last time when she had kidney problems.  More likely in my mind is the effects of Depakote as she started this medication about the time the problem began.  She is gone from 1 pill to 2 pills.  We will start by backing off to 1 pill of Depakote daily.  "

## 2019-01-10 NOTE — ASSESSMENT & PLAN NOTE
Requiring pain medication.  Tramadol is probably best .  I voiced some concern that tramadol could be leading to sensation of foggy intellect.  However patient's been taking this for a long time and has not had problems and clearly needs something stronger than Tylenol and clearly nonsteroidal anti-inflammatories or not the answer right now.  So, for now, continue tramadol

## 2019-04-03 NOTE — ASSESSMENT & PLAN NOTE
Diet controlled, on pravastatin 40, losartan, aspirin.  A1c excellent at 6.2 today.  Sees her eye doctor regularly for glaucoma.  No changes

## 2019-04-03 NOTE — ASSESSMENT & PLAN NOTE
Losartan recently started by Dr. bhatt.  Blood pressure borderline today but has follow-up with her in 2 weeks.  Going to defer this to her

## 2019-04-03 NOTE — ASSESSMENT & PLAN NOTE
1 month duration, following fall.  Mechanism appears to be blunt trauma.  Pain is mostly below the knee in proximal anterior tibia.  X-ray appears normal except for soft tissue swelling.  Has been fairly sedentary, pushed her to move and if unable, consider physical therapy.  Commend ice, analgesic

## 2019-04-10 NOTE — ASSESSMENT & PLAN NOTE
Recent high impact fall described on most recent visit.  Pain below the knee at that time, negative x-ray, this pain seems resolved.  Recently slipped out of bed and landed on both knees, now bilateralanterior knee pain.  Obvious degenerative change in patellar compartment on previous x-ray.    No effusion  Suspect that patient's increase in pain recently is due to deconditioning and minor trauma.    I ordered a left knee x-ray since this has not been imaged -however we do not have x-ray available today    Discussed importance of quad strengthening, referral to physical therapy.    Patient requested/discussed option of corticosteroid injection.  Agreed to do this to try to enable rehab.      Knee Arthrocentesis  Injection Procedure Note    Pre-operative Diagnosis: bilateral,  Knee osteoarthritis    Post-operative Diagnosis: same    Indications: pain      Procedure Details   Verbal consent was obtained for procedure.  Area was prepped with alcohol.  Landmarks were palpated and 27-gauge 1-1/4 needle advanced from medial approach under the patella into the joint space.  Medications then injected.  Needle removed.    40 mg of Depo-Medrol, 1 cc of lidocaine injected in each knee    Complications:  None; patient tolerated the procedure well.

## 2019-04-10 NOTE — ASSESSMENT & PLAN NOTE
Patient at risk of hip fracture, I very much doubt this as pain is lateral and she has very little pain with rotation of the hip.  Nonetheless, with recent fall x-ray ordered.  Unfortunately, staff not available to do x-ray today, will come back tomorrow for procedure only.

## 2019-05-23 ENCOUNTER — HOME CARE/HOSPICE - HEALTHEAST (OUTPATIENT)
Dept: HOSPICE | Facility: HOSPICE | Age: 69
End: 2019-05-23

## 2019-05-29 ENCOUNTER — COMMUNICATION - HEALTHEAST (OUTPATIENT)
Dept: NURSING | Facility: CLINIC | Age: 69
End: 2019-05-29

## 2021-05-24 ENCOUNTER — RECORDS - HEALTHEAST (OUTPATIENT)
Dept: ADMINISTRATIVE | Facility: CLINIC | Age: 71
End: 2021-05-24

## 2021-05-27 NOTE — PROGRESS NOTES
Assessment/ Plan  Problem List Items Addressed This Visit        Unprioritized    Anterior knee pain, unspecified laterality     Recent high impact fall described on most recent visit.  Pain below the knee at that time, negative x-ray, this pain seems resolved.  Recently slipped out of bed and landed on both knees, now bilateral anterior knee pain.  Obvious degenerative change in patellar compartment on previous x-ray.    No effusion  Suspect that patient's increase in pain recently is due to deconditioning and minor trauma.    I ordered a left knee x-ray since this has not been imaged -however we do not have x-ray available today    Discussed importance of quad strengthening, referral to physical therapy.    Patient requested/discussed option of corticosteroid injection.  Agreed to do this to try to enable rehab.      Knee Arthrocentesis  Injection Procedure Note    Pre-operative Diagnosis: bilateral,  Knee osteoarthritis    Post-operative Diagnosis: same    Indications: pain      Procedure Details   Verbal consent was obtained for procedure.  Area was prepped with alcohol.  Landmarks were palpated and 27-gauge 1-1/4 needle advanced from medial approach under the patella into the joint space.  Medications then injected.  Needle removed.    40 mg of Depo-Medrol, 1 cc of lidocaine injected in each knee    Complications:  None; patient tolerated the procedure well.         Relevant Orders    XR Knee Right Plus [place] VW    Ambulatory referral to PT/OT    Abnormal gait    Relevant Orders    Ambulatory referral to PT/OT    Hip pain, right     Patient at risk of hip fracture, I very much doubt this as pain is lateral and she has very little pain with rotation of the hip.  Nonetheless, with recent fall x-ray ordered.  Unfortunately, staff not available to do x-ray today, will come back tomorrow for procedure only.         Relevant Orders    XR Pelvis W 2 Vw Hip Right    Ambulatory referral to PT/OT    Fall, initial  encounter - Primary    Relevant Orders    Ambulatory referral to PT/OT        Subjective  CC:  Chief Complaint   Patient presents with     Fall     4/8 fell at night and 4/9 in the am     Pain     both knee and right hip     HPI:      Fall history-seen 4/3 for knee pain.    Fall  Narrative:  -------------------------------  When did this happen? 2+ WEEKS  Description of fall: maybe slipped on ice  Was there anything that triggered fall- dizziness, passing out? no  What is wrong now?knee pain and R thigh pain  LOC?  no  Previous/ Recent falls? no        Bilat knee Pain     Duration/ timing of onset: 2 weeks  Location of pain in frot    Severity/ Quality: severe pain with walking  Modifying factors: Make it worse- walking   Make it better-rest  History of knee problems/injury or previous work-up/ treatment? no  Clicking or popping? no  Swelling? Yes- anterior     Straight was negative.    R Hip pain    Duration/ timing of onset: Chronic pain, worse since fall  Location of pain lat- fell 4/8    Severity/ Quality: Moderate  Modifying factors: Make it worse-weightbearing, walking   make it better-rest  History of hip  problems/injury or previous work-up/ treatment?  As above    PFSH:  Patient Active Problem List   Diagnosis     Glaucoma     Hypokalemia     Leukocytosis, unspecified type     Hyperlipidemia     Nicotine Dependence     Essential hypertension     Trochanteric Bursitis     Controlled substance agreement signed     Health care maintenance     Degenerative joint disease (DJD) of hip     Acute blood loss anemia     Diet-controlled type 2 diabetes mellitus (H)     Osteoporosis     Myalgia     Headache     Rotator cuff tendinitis, left     Persistent insomnia     Hair loss     MRACUS (acute kidney injury) (H)     Hypercalcemia     Memory changes     Venous stasis dermatitis of both lower extremities     Anterior knee pain, unspecified laterality     Abnormal gait     Hip pain, right     Fall, initial encounter      Current medications reviewed as follows:  Current Outpatient Medications on File Prior to Visit   Medication Sig     acetaminophen (PAIN RELIEF) 650 MG CR tablet TAKE 2 TABLETS BY MOUTH THREE TIMES DAILY (Patient taking differently: TAKE 2 TABLETS BY MOUTH THREE TIMES DAILY. Pt usually takes 1-2 times per day.)     amitriptyline (ELAVIL) 10 MG tablet      amLODIPine (NORVASC) 10 MG tablet Take 1 tablet (10 mg total) by mouth daily.     aspirin 81 MG EC tablet Take 1 tablet (81 mg total) by mouth daily.     atropine 1 % ophthalmic solution INT 1 GTT IN OS BID     biotin 5,000 mcg TbDL Take 1 tablet by mouth daily.     brimonidine (ALPHAGAN) 0.2 % ophthalmic solution      cholecalciferol, vitamin D3, (VITAMIN D3) 2,000 unit Tab TAKE 1 TABLET BY MOUTH EVERY DAY     codeine-guaiFENesin (GUAIFENESIN AC)  mg/5 mL liquid Take 10 mL by mouth 3 (three) times a day as needed for cough.     divalproex (DEPAKOTE ER) 250 MG 24 hour tablet Take 250 mg by mouth daily.     DUREZOL 0.05 % Drop      gabapentin (NEURONTIN) 300 MG capsule TK 1 C PO NIGHTLY FOR HEADACHE PREVENTION     losartan (COZAAR) 25 MG tablet Take 25 mg by mouth daily.     meloxicam (MOBIC) 15 MG tablet      moxifloxacin (VIGAMOX) 0.5 % ophthalmic solution INT 1 GTT IN OS FOUR TIMES DAILY. START 1 DAY B SURGERY AND U UNTIL BOTTLE IS FINISHED     potassium chloride (KLOR-CON) 10 MEQ CR tablet TAKE 2 TABLETS(20 MEQ) BY MOUTH TWICE DAILY     pravastatin (PRAVACHOL) 40 MG tablet ALTERNATE 2 TABLETS MY MOUTH AND 1 TABLET EVERY OTHER DAY     prednisoLONE acetate (PRED-FORTE) 1 % ophthalmic suspension SHAKE LQ AND INT 1 GTT IN OS FOUR TIMES DAILY. START 1 DAY B SURGERY     predniSONE (DELTASONE) 20 MG tablet 80 mg po daily for 7 days, 60 p.o. daily for 7 days, 40 p.o. daily for 7 days, 20 p.o. daily for 7 days, 10 p.o. daily for 7 days then stop     tobramycin-dexamethasone (TOBRADEX) ophthalmic solution INSTILL  1  DROP   IN  LEFT  EYE  QID    UTD     traMADol  (ULTRAM) 50 mg tablet TAKE 1 TO 2 TABLETS BY MOUTH EVERY 6 HOURS AS NEEDED     triamcinolone (KENALOG) 0.1 % cream Applied to rash twice a day for 2-3 weeks.  Do not use longer than 3 weeks.     CALCIUM CARBONATE/VITAMIN D3 (CALCIUM 600 + D,3, ORAL) Take 1,200 mg by mouth daily.     No current facility-administered medications on file prior to visit.      Social History     Tobacco Use   Smoking Status Current Every Day Smoker     Packs/day: 0.25   Smokeless Tobacco Never Used     Social History     Social History Narrative     Not on file     Patient Care Team:  Wicho Aguayo MD as PCP - General  Liliam Salazar CMA as Clinic Care Coordination Care Guide (Primary Care - CC)  Briseyda Trujillo DO as Physician (General Surgery)  ROS  Full 10 system review including constitutional, respiratory, cardiac, gi, urinary, rheumatologic, neurologic, reproductive, dermatologic psychiatric is  performed  and is otherwise negative         Objective  Physical Exam  Vitals:    04/10/19 0914 04/10/19 1003   BP: (!) 156/98 138/90   Patient Site: Right Arm Right Arm   Patient Position: Sitting Sitting   Cuff Size: Adult Regular Adult Regular   Pulse: 66    Resp: 20    Weight: 116 lb 8 oz (52.8 kg)      Body mass index is 22.75 kg/m .  Pain on right greater trochanter, no pain on internal and external rotation of hip.  Both knees are examined, there is positive patellar compression test and both of them with crepitus, no effusion, pain anteriorly with extreme flexion, negative drawer, no pain/laxity on stressing of collateral ligaments  Diagnostics:   None      Please note: Voice recognition software was used in this dictation.  It may therefore contain typographical errors.

## 2021-05-27 NOTE — TELEPHONE ENCOUNTER
Who is calling:  Patient  Reason for Call:  Patient stated that she is out of this medication and would like it refilled today if possible  Date of last appointment with primary care: 1/10  Okay to leave a detailed message: No

## 2021-05-27 NOTE — TELEPHONE ENCOUNTER
Controlled Substance Refill Request  Medication:   Requested Prescriptions     Pending Prescriptions Disp Refills     traMADol (ULTRAM) 50 mg tablet [Pharmacy Med Name: TRAMADOL 50MG TABLETS] 150 tablet 0     Sig: TAKE 1 TO 2 TABLETS BY MOUTH EVERY 6 HOURS AS NEEDED     Date Last Fill: 2/26/19  Pharmacy: [id number]   Submit electronically to pharmacy  Controlled Substance Agreement on File:   Encounter-Level CSA Scan Date:    There are no encounter-level csa scan date.       Last office visit: Last office visit pertaining to requested medication was 1/10/19.

## 2021-05-27 NOTE — PROGRESS NOTES
"  Assessment/ Plan  Problem List Items Addressed This Visit        High    Essential hypertension     Losartan recently started by Dr. bhatt.  Blood pressure borderline today but has follow-up with her in 2 weeks.  Going to defer this to her            Unprioritized    Diet-controlled type 2 diabetes mellitus (H)     Diet controlled, on pravastatin 40, losartan, aspirin.  A1c excellent at 6.2 today.  Sees her eye doctor regularly for glaucoma.  No changes         Relevant Orders    Glycosylated Hemoglobin A1c (Completed)    ALT (SGPT)    LDL Cholesterol, Direct    Rotator cuff tendinitis, left     Severe rotator cuff degeneration, saw Dr. pardo.  Plan is surgery.         Venous stasis dermatitis of both lower extremities     New problem  Minimal venous stasis changes or swelling.  Distribution classic.  Triamcinolone         Relevant Medications    triamcinolone (KENALOG) 0.1 % cream    Acute pain of right knee     1 month duration, following fall.  Mechanism appears to be blunt trauma.  Pain is mostly below the knee in proximal anterior tibia.  X-ray appears normal except for soft tissue swelling.  Has been fairly sedentary, pushed her to move and if unable, consider physical therapy.  Commend ice, analgesic         Relevant Orders    XR Tibia and Fibula Right      Other Visit Diagnoses     Fall, initial encounter    -  Primary    Relevant Orders    XR Tibia and Fibula Right        Subjective  CC:  Chief Complaint   Patient presents with     Follow-up     fell 2 weeks ago, having pain in both knees     HPI:    Olga Lidia is a 68-year-old with history of hypertension, long-term smoking, diet-controlled type 2 diabetes fairly recently diagnosed, osteoporosis.  2 or 3 years ago, she was hospitalized for acute kidney injury associated with sepsis.  Recently, 1/4/19 she saw my partner for \"feeling foggy\" which was reminiscent of sepsis and acute kidney injury upon that visit, she was noted to have a significant bump in " "her baseline creatinine to 1.76 (most recent had been 0.88 in February 2018), she had a low potassium of 3.3 and a slightly elevated calcium of 10.7 with a normal albumin of 3.8.  Urinalysis at that time was positive for only a few bacteria but a new finding of greater than 300 mg/dL of protein.     She saw me on follow-up of this visit on 1/10/19.  At that time her creatinine had improved to 1.03 and BUN was 15 but her calcium remained high.  It was 11.0 and ionized calcium was slightly elevated at 1.32.     She is not on a thiazide diuretic     Parathyroid was low normal at 25, semi-quantitative urine protein, microalbumin/creatinine ratio was 1834, showed an elevated alpha 2 fraction to suggest inflammation and a decreased gammaglobulin fraction.  Gammaglobulin percent was 6.1, normal 9-20, absolute gammaglobulin 0.4, normal 0.6-1.4.  Both vitamin D levels, dihydroxy and mono hydroxy were normal     She had a parathyroid hormone related protein level which was normal as well.        Her urine protein electrophoresis was \"unremarkable\"  I spoke with Dr. Patterson about the SPEP and he felt this was most likely inflammation rather than a bone marrow disorder    Followed by nephrology.  Reviewed note 3/7/1980 K I versus CKD, renal ultrasound, renal panel, UA, urine protein creatinine ratio checked.  Check BMP, monitor potassium, avoid nonsteroidals expect hypercalcemia due to recent calcium supplements and high-dose vitamin D.  Stop vitamin D, stop calcium placement, repeat calcium, PTH, light chain  Discussed recommendation of neurology for gabapentin but need to watch dosing due to GFR- Dr Dennis  Fall  Narrative:  -------------------------------  When did this happen? 2+ WEEKS  Description of fall: maybe slipped on ice  Was there anything that triggered fall- dizziness, passing out? no  What is wrong now?knee pain and R thigh pain  LOC?  no  Previous/ Recent falls? no      Right knee Pain    Duration/ timing of " onset: 2 weeks  Location of pain in frot    Severity/ Quality: severe pain with walking  Modifying factors: Make it worse- walking   Make it better-rest  History of knee problems/injury or previous work-up/ treatment? no  Clicking or popping? no  Swelling? Yes- anterior      Rash  --------------------  Location/ Distrobution: lower legs bilat  Duration: A few months  Onset: Gradual  Itchy?  Yes  Painful?  No    Things that help : Has not found anything    PFSH:  Patient Active Problem List   Diagnosis     Glaucoma     Hypokalemia     Leukocytosis, unspecified type     Hyperlipidemia     Nicotine Dependence     Essential hypertension     Trochanteric Bursitis     Controlled substance agreement signed     Health care maintenance     Degenerative joint disease (DJD) of hip     Acute blood loss anemia     Diet-controlled type 2 diabetes mellitus (H)     Osteoporosis     Myalgia     Headache     Rotator cuff tendinitis, left     Persistent insomnia     Hair loss     MARCUS (acute kidney injury) (H)     Hypercalcemia     Memory changes     Venous stasis dermatitis of both lower extremities     Acute pain of right knee     Current medications reviewed as follows:  Current Outpatient Medications on File Prior to Visit   Medication Sig     acetaminophen (PAIN RELIEF) 650 MG CR tablet TAKE 2 TABLETS BY MOUTH THREE TIMES DAILY (Patient taking differently: TAKE 2 TABLETS BY MOUTH THREE TIMES DAILY. Pt usually takes 1-2 times per day.)     aspirin 81 MG EC tablet Take 1 tablet (81 mg total) by mouth daily.     atropine 1 % ophthalmic solution INT 1 GTT IN OS BID     biotin 5,000 mcg TbDL Take 1 tablet by mouth daily.     brimonidine (ALPHAGAN) 0.2 % ophthalmic solution      DUREZOL 0.05 % Drop      gabapentin (NEURONTIN) 300 MG capsule TK 1 C PO NIGHTLY FOR HEADACHE PREVENTION     losartan (COZAAR) 25 MG tablet Take 25 mg by mouth daily.     moxifloxacin (VIGAMOX) 0.5 % ophthalmic solution INT 1 GTT IN OS FOUR TIMES DAILY. START 1  DAY B SURGERY AND U UNTIL BOTTLE IS FINISHED     potassium chloride (KLOR-CON) 10 MEQ CR tablet TAKE 2 TABLETS(20 MEQ) BY MOUTH TWICE DAILY     pravastatin (PRAVACHOL) 40 MG tablet ALTERNATE 2 TABLETS MY MOUTH AND 1 TABLET EVERY OTHER DAY     tobramycin-dexamethasone (TOBRADEX) ophthalmic solution INSTILL  1  DROP   IN  LEFT  EYE  QID    UTD     traMADol (ULTRAM) 50 mg tablet TAKE 1 TO 2 TABLETS BY MOUTH EVERY 6 HOURS AS NEEDED     amitriptyline (ELAVIL) 10 MG tablet      amLODIPine (NORVASC) 10 MG tablet Take 1 tablet (10 mg total) by mouth daily.     CALCIUM CARBONATE/VITAMIN D3 (CALCIUM 600 + D,3, ORAL) Take 1,200 mg by mouth daily.     cholecalciferol, vitamin D3, (VITAMIN D3) 2,000 unit Tab TAKE 1 TABLET BY MOUTH EVERY DAY     codeine-guaiFENesin (GUAIFENESIN AC)  mg/5 mL liquid Take 10 mL by mouth 3 (three) times a day as needed for cough.     divalproex (DEPAKOTE ER) 250 MG 24 hour tablet Take 250 mg by mouth daily.     meloxicam (MOBIC) 15 MG tablet      prednisoLONE acetate (PRED-FORTE) 1 % ophthalmic suspension SHAKE LQ AND INT 1 GTT IN OS FOUR TIMES DAILY. START 1 DAY B SURGERY     predniSONE (DELTASONE) 20 MG tablet 80 mg po daily for 7 days, 60 p.o. daily for 7 days, 40 p.o. daily for 7 days, 20 p.o. daily for 7 days, 10 p.o. daily for 7 days then stop     No current facility-administered medications on file prior to visit.      Social History     Tobacco Use   Smoking Status Current Every Day Smoker     Packs/day: 0.25   Smokeless Tobacco Never Used     Social History     Social History Narrative     Not on file     Patient Care Team:  Wicho Aguayo MD as PCP - General  Liliam Salazar CMA as Clinic Care Coordination Care Guide (Primary Care - CC)  Briseyda Trujillo DO as Physician (General Surgery)  GLORIA  Full 10 system review including constitutional, respiratory, cardiac, gi, urinary, rheumatologic, neurologic, reproductive, dermatologic psychiatric is  performed  and is otherwise negative          Objective  Physical Exam  Vitals:    04/03/19 1015 04/03/19 1124   BP: 146/84 134/84   Patient Site: Right Arm Right Arm   Patient Position: Sitting Sitting   Cuff Size: Adult Regular Adult Regular   Pulse: 70    Resp: 18    Weight: 119 lb (54 kg)      Body mass index is 23.24 kg/m .       Right knee is swollen on inspection-swelling is below the knee there is no sign of effusion and overlying skin is not erythematous.  Palpation of the knee shows  No tenderness of the patella and negative patellar compression test.  No tenderness of the medial joint line.  No tenderness of the lateral joint line.  There is no tenderness with stress of the MCL or LCL.  Drawer test is negative Jillian's test is not done fully but basically appears negative        Diagnostics:   Personally reviewed knee x-ray which shows degenerative changes, predominantly in patellar region but I do not see any fracture.  There is definitely some soft tissue swelling.  Results for orders placed or performed in visit on 04/03/19   Glycosylated Hemoglobin A1c   Result Value Ref Range    Hemoglobin A1c 6.2 (H) 3.5 - 6.0 %       Please note: Voice recognition software was used in this dictation.  It may therefore contain typographical errors.       parent

## 2021-05-27 NOTE — PROGRESS NOTES
Patient Outreach Follow Up:   Reason: goal follow up    Attempt 3: Care Guide called patient. If this patient is returning my call, please transfer to [phone]. I have called this patient 3 times over the past two to four weeks and have been unsuccessful in reaching her. This patient has also not returned any of my messages. I will continue attempting to reach out to this patient in one month. I will also check this patient's chart for upcoming appointments, ER reports that may contain a new phone number, or any other recent activity.    Plan:   Next outreach due Monday, 05/13/19.

## 2021-05-27 NOTE — PROGRESS NOTES
Attempt 1: Care Guide called patient.  If this patient is returning my call, please transfer to [facility] at ext [phone].    Next outreach:4/10/19

## 2021-05-28 NOTE — PROGRESS NOTES
[facility]   Balance Initial Evaluation    Patient Name: Olga Lidia Holm  Date of evaluation: 2019  Referral Diagnosis: Fall, initial encounter  Referring provider: Wicho Aguayo, *  Visit Diagnosis:     ICD-10-CM    1. Bilateral chronic knee pain M25.561     M25.562     G89.29    2. Acute right hip pain M25.551    3. Unsteadiness on feet R26.81        No data recorded     Assessment:      Impairments in  pain, posture, ROM, joint mobility, strength, gait/locomotion and balance  Patient's signs and symptoms are consistent with weakness, gait instability, knee and hip pain.  Patient responded well to manual therapy and exercises.  Prognosis to achieve goals is  fair   Pt. is appropriate for skilled PT intervention as outlined in the Plan of Care (POC).  Based on falls assessment, patient is at an increased risk for falling. Plan of care will address this risk with lower extremity strengthening and balance training.    Goals:  No data recorded  No data recorded    Patient's expectations/goals are realistic.    Barriers to Learning or Achieving Goals:  No Barriers.       Plan / Patient Instructions:        Plan of Care:   No data recorded    Pt. is in agreement with the Plan of Care  A Home Exercise Program (HEP) was initiated today.  Pt. was instructed in exercises by PT and patient was given a handout with detailed instructions.  Plan for next visit: review HEP, continue manual therapy, gait and progression of strengthening exercises.     Subjective:           History of Present Illness:    Olga Lidia is a 68 y.o. female who presents to therapy today with complaints of bilateral knee pain, right hip pain, 3 recent falls, gait instability, left>right shoulder pain. Date of onset:  Chronic. PT ordered 2018. Onset was gradual. Symptoms are constant. She reports  a constant  history of similar symptoms. She describes their previous level of function as not limited    Pain Ratin  Pain rating at  best: 5  Pain rating at worst: 10  Pain description: aching, dull, pain, sharp, shooting, soreness and weakness    Functional limitations are described as occurring with:   ascending and descending stairs or curbs  balance  bending  lifting  sitting    sleeping  standing    transitional movements sit to stand and sit to supine  walking           Objective:      Note: Items left blank indicates the item was not performed or not indicated at the time of the evaluation.    Patient Outcome Measures :    Lower Extremity Functional Scale (_/80): 27     Scores range from 0-80, where a score of 80 represents maximum function. The minimal clinically important difference is a positive change of 9 points.    Balance Examination  1. Bilateral chronic knee pain     2. Acute right hip pain     3. Unsteadiness on feet       Precautions / Restrictions : glaucoma, hypokalemia, hyperlipidemia, HTN, DJD, DMIImyalgia, HA, venous stasis of bilateral LE abnormal gait, h/o of multiple falls.     Involved side: Bilateral  Posture Observation:      Cervical:  Severe forward head  Shoulder/Thoracic complex: Moderate bilateral scapular protraction   Severely increased upper thoracic kyphosis  Lower extremity:  Knee:  Bilateral genu varus.  Assistive Device:  4WW  Gait Observation:  Antalgic gait, compensated trendelenberg gait pattern    LE Strength: Hip strength: flexion 4-/5 bilateral extension 4-/5 bilateral, abduction 3+bilateral, knee extension 4/5 secondary to pain, ankle DF 5/5    LE ROM: lacking 10 degrees of knee extension bilateral.    Balance Assessment:    Rhomberg - Eyes Open:  30 seconds  Rhomberg - Eyes Closed:  5 seconds  APTA sit < > stand:  0 in 30 seconds      Treatment Today     Exercises:  Exercise #1: heel slides  Comment #1: 10 bilateral  Exercise #2: self mobilization of patella  Comment #2: inferior, superior, medial, x 10 each bilateral  Exercise #3: patient instruction to use 4WW for all ambulation, slow gait and  movement down when turning or moving laterally, stay with the walker,      MFR layers 1-3 right: TFL, quad, hamstring, gastroc,     Manual therapy:  right patella inferior mobilization for knee flexion    Rate/grade Target  Direction  Relative movement Location in range Patient position   2,3 Superior pole of patella Inferior    Inferior glide of patella in the femoral groove. Varying degrees of knee flexion from full extension to end-range flexion. Supine      Manual therapy:  right patella superior mobilization    Rate/grade Target  Direction  Relative movement Location in range Patient position   2,3 Inferior border of patella Superior   Superior glide of patella in the femoral groove. Full knee extension  Supine      Manual therapy:  right patella medial mobilization    Rate/grade Target  Direction  Relative movement Location in range Patient position   2,3 Lateral border of patella Medial   Medial glide of patella on the femoral groove. Full knee extension  Supine          TREATMENT MINUTES COMMENTS   Evaluation 30    Self-care/ Home management     Manual therapy 15    Neuromuscular Re-education     Therapeutic Activity     Therapeutic Exercises 10    Gait training     Modality__________________                Total 55    Blank areas are intentional and mean the treatment did not include these items.     PT Evaluation Code: (Please list factors)  Patient History/Comorbidities:   Patient Active Problem List   Diagnosis     Glaucoma     Hypokalemia     Leukocytosis, unspecified type     Hyperlipidemia     Nicotine Dependence     Essential hypertension     Trochanteric Bursitis     Controlled substance agreement signed     Health care maintenance     Degenerative joint disease (DJD) of hip     Acute blood loss anemia     Diet-controlled type 2 diabetes mellitus (H)     Osteoporosis     Myalgia     Headache     Rotator cuff tendinitis, left     Persistent insomnia     Hair loss     MARCUS (acute kidney injury) (H)      Hypercalcemia     Memory changes     Venous stasis dermatitis of both lower extremities     Anterior knee pain, unspecified laterality     Abnormal gait     Hip pain, right     Fall, initial encounter      Examination: as above  Clinical Presentation: stable  Clinical Decision Making: low    Patient History/  Comorbidities Examination  (body structures and functions, activity limitations, and/or participation restrictions) Clinical Presentation Clinical Decision Making (Complexity)   No documented Comorbidities or personal factors 1-2 Elements Stable and/or uncomplicated Low   1-2 documented comorbidities or personal factor 3 Elements Evolving clinical presentation with changing characteristics Moderate   3-4 documented comorbidities or personal factors 4 or more Unstable and unpredictable High                Akil Wynne, PT  4/18/2019  10:30 AM

## 2021-05-28 NOTE — TELEPHONE ENCOUNTER
Controlled Substance Refill Request  Medication:   Requested Prescriptions     Pending Prescriptions Disp Refills     traMADol (ULTRAM) 50 mg tablet [Pharmacy Med Name: TRAMADOL 50MG TABLETS] 150 tablet 0     Sig: TAKE 1 TO 2 TABLETS BY MOUTH EVERY 6 HOURS AS NEEDED     Date Last Fill: 3/27/19  Pharmacy: [id number]   Submit electronically to pharmacy  Controlled Substance Agreement on File:   Encounter-Level CSA Scan Date:    There are no encounter-level csa scan date.       Last office visit: Last office visit pertaining to requested medication was 4/10/19.

## 2021-05-28 NOTE — PROGRESS NOTES
Upon chart review it appears pt is still in the hospital pending possible hospice transfer, will check back in 2 weeks to see if pt is indeed transfered to hospice. If she is pt will be removed from CCC panel at that time.    Next outreach: 5/29/19

## 2021-05-28 NOTE — PROCEDURES
[facility] Post-Procedure    Procedure: Diagnostic cerebral angiogram    Radiologist: Ant Moody    Contrast: 75 mL Omnipaque 350    Fluoro time: 12 minutes  Fluoro dose: 1560 mGy    Medications: None  Sedation Time: N/A     EBL: 20 cc  Complications: None    Preliminary findings: (see dictation for full detail)  1. No evidence of cerebral vasculopathy.   2. 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  3. Mild stenosis of the proximal internal carotid arteries bilaterally.     Assess/Plan:   Routine post procedure monitoing  Bedrest for 4 hours  Report to ordering provider    Ant Moody  [phone]

## 2021-05-29 NOTE — PROGRESS NOTES
Patient Outreach:   Reason: goal outreach follow up; hospital discharge notification; call the patient family-sorry for the lost of their love one & thanks for allowing [facility] service to work with patient until this time.      Writer received a hospital discharge notification for this patient. Noticed of patient is . Outreach follow up due today, 19.  Writer call the patient family/Emergency , Jaylyn Lay at [phone] to thanks the family for the patient for allowing Clinic Care Coordination Service (CCC) to coordinate care for the patient until this time. Call and no answer. Left message to thanks the family; sorry for the loss of their loved one; Our thoughts are with the family; and that patient is removed from [facility] Service.     Patient is  and has been removed from [facility] enrolled patient list. Writer name and contact info is removed from the patient chart as of today, 2019 at 9:23AM. No further outreach/action from writer at this time.   Rest in peace in your journey, Olga Lidia Holm.     Plan:   No Action.

## 2021-05-30 ENCOUNTER — RECORDS - HEALTHEAST (OUTPATIENT)
Dept: ADMINISTRATIVE | Facility: CLINIC | Age: 71
End: 2021-05-30

## 2021-05-31 ENCOUNTER — RECORDS - HEALTHEAST (OUTPATIENT)
Dept: ADMINISTRATIVE | Facility: CLINIC | Age: 71
End: 2021-05-31

## 2021-05-31 VITALS — HEIGHT: 60 IN | WEIGHT: 134 LBS | BODY MASS INDEX: 26.31 KG/M2

## 2021-05-31 VITALS — HEIGHT: 60 IN | BODY MASS INDEX: 26.11 KG/M2 | WEIGHT: 133 LBS

## 2021-06-01 ENCOUNTER — RECORDS - HEALTHEAST (OUTPATIENT)
Dept: ADMINISTRATIVE | Facility: CLINIC | Age: 71
End: 2021-06-01

## 2021-06-01 VITALS — WEIGHT: 130 LBS | HEIGHT: 60 IN | BODY MASS INDEX: 25.52 KG/M2

## 2021-06-01 VITALS — BODY MASS INDEX: 26.61 KG/M2 | HEIGHT: 59 IN | WEIGHT: 132 LBS

## 2021-06-01 VITALS — WEIGHT: 129.75 LBS | BODY MASS INDEX: 25.99 KG/M2

## 2021-06-01 VITALS — WEIGHT: 130 LBS | BODY MASS INDEX: 26.04 KG/M2

## 2021-06-02 VITALS — WEIGHT: 116.5 LBS | BODY MASS INDEX: 22.75 KG/M2

## 2021-06-02 VITALS — BODY MASS INDEX: 22.38 KG/M2 | WEIGHT: 114 LBS | HEIGHT: 60 IN

## 2021-06-02 VITALS — BODY MASS INDEX: 25.29 KG/M2 | WEIGHT: 129.5 LBS

## 2021-06-02 VITALS — WEIGHT: 119 LBS | BODY MASS INDEX: 23.24 KG/M2

## 2021-06-02 VITALS — WEIGHT: 116 LBS | BODY MASS INDEX: 22.65 KG/M2

## 2021-06-02 VITALS — BODY MASS INDEX: 24.51 KG/M2 | WEIGHT: 125.5 LBS

## 2021-06-09 NOTE — PROGRESS NOTES
Patient outreach follow up:  From March 2 to March 22, Clinic Care Guide have been unable to reach patient over three times.   3/22/17 at 2:37PM: Left another voice message for pt to call back to Clinic Care Guide; phone number is provided.     03/22/17 at 2:39 PM: Called Jaylyn pt emergency  at phone number [phone] and spoke with Jaylyn. Verified pt contact phone number and confirmed correct per Jaylyn. Introduced self and role of job to Jaylyn.   Jaylyn reported:   -patient is still struggle with finding job.  -most concern for patient at this time is getting medical assistance. Patient did not respond/return form to the [facility] (Not through employer) on time and now, patient do not have medical insurance until July 2017.   -Patient is still laid-off from last job.   -pt is doing good and have no major health problem at this time.   -pt have not complain of any pain or health concern with Jaylyn.     Jaylyn is informed, things relate to medical insurance or [facility] SNAP and cash benefit, CCC service can help pt with that. [facility] dept/service have a CCC FRG, [facility] SW and CCC RN to help pt. Prefer pt to call and will coordinate pt with [facility] FRG for medical insurance.     Future appointment:  As of 3/22/17, pt have no f/u appt within [facility].     Plan:  Outreach frequency: in every three months.

## 2021-06-11 NOTE — PROGRESS NOTES
Patient was seen today.  Upset that we insisted she come in in order to get her medications renewed.  Does not have medical insurance now but will get it on July 1.  I think it is okay to wait until then.  We will no charge this visit.

## 2021-06-11 NOTE — PROGRESS NOTES
Assessment/ Plan  1. Type 2 diabetes mellitus without complication  See below  - Basic Metabolic Panel  - Microalbumin, Random Urine  - Glycosylated Hemoglobin A1c    2. Hyperlipidemia  Pravastatin check labs    3. Osteoporosis  History of low bone density in the past, will recheck now, the duration of treatment going for  - DXA Bone Density Scan; Future    4. Pain of toe of right foot  Stubbed toe 1 month ago  - XR Toe Right 2 or More VWS; Future    5. Toe middle phalanx fracture, left  Comminuted, displaced fracture involving at least half of the PIP joint.  - Ambulatory referral to Orthopedics    6. Controlled substance agreement signed  Did not renew this today.  Will plan discussion in the future.  Ran out of time with the above problem    Chronic Problems  Essential hypertension  Well-controlled on amlodipine 10 mg, no changes.    Diet-controlled type 2 diabetes mellitus  Assessment of blood sugar control: Excellent control with alone.  Lost a lot of weight shortly after onset did A1c 5.9  Lab Results   Component Value Date    HGBA1C 6.1 (H) 09/29/2016     Diabetes medication: none- diet  Statin medication (>40 or ^CVD Risk)- yes pravastatin  At blood pressure goal (JNC 8 <140/90 all ages): yes  Lab Results   Component Value Date    MICROALBUR <0.50 04/08/2015     Ace/arb indicated and taking? No- lis lead to cough  Low dose ASA? (indicated for most men> 50, most women > 60 if any other card RF - no- will start  Up to date with yearly dilated retina eval? yes    Plan no change, continue diet  Follow-up 6 month    Controlled substance agreement signed  In adequate time to update today.  Will plan with next visit.  Continue tramadol, 5 per day.      - traMADol (ULTRAM) 50 mg tablet; Take 1 tablet (50 mg total) by mouth every 6 (six) hours as needed for pain.  Dispense: 150 tablet; Refill: 2    Body mass index is 25.97 kg/(m^2).    Subjective  CC:  Chief Complaint   Patient presents with     MEDICATION CHECK      HPI:    Hoarseness in a smoker  Noticed by me-patient states no difference than previous.  Hoarseness comes and goes.  Continues to smoke  Still smoking    Recent history of severe eye infection  Seeing eye doctor  Now coverage for    HTN- on amlodipine    DMII   Testing blood sugars/ frequency?  Not checking    Lifestyle   -diet tries to eat lots of fruits and vegetables  -exercise/activity no    Dilated eye exam in last year?  Yes, been seeing the eye doctor for other purposes lately.  At goal BP?  Yes      Chronic pain management for back and hip pain  Tramadol   Controls hip and back pain well.  No side effects          Toe Pain; 2nd Toe RFoot  Narrative: stubbed to fathers day weekend- early June- about 1 month  Turned purple  Notes:  Duration/ timing of onset: 1 mo  Location of pain distal toe    Severity/ Quality: moderate- improving slowly but very swollen at the beginning    History of toe problems/injury or previous work-up/ treatment? no    Current medications reviewed as follows:  Current Outpatient Prescriptions on File Prior to Visit   Medication Sig     acetaminophen (PAIN RELIEF) 650 MG CR tablet TAKE 2 TABLETS BY MOUTH THREE TIMES DAILY     amLODIPine (NORVASC) 10 MG tablet Take 1 tablet (10 mg total) by mouth daily.     cholecalciferol, vitamin D3, (VITAMIN D3) 2,000 unit Tab TAKE 1 TABLET BY MOUTH EVERY DAY     pravastatin (PRAVACHOL) 40 MG tablet ALTERNATE 2 TABLETS BY MOUTH AND 1 TABLET EVERY OTHER DAY     [DISCONTINUED] biotin 1 mg tablet Take 1,000 mcg by mouth daily.     [DISCONTINUED] tobramycin-dexamethasone (TOBRADEX) ophthalmic solution SHAKE LQ AND INT 1 GTT IN OS QID     [DISCONTINUED] traMADol (ULTRAM) 50 mg tablet Take 1 tablet (50 mg total) by mouth every 6 (six) hours as needed for pain.     No current facility-administered medications on file prior to visit.      Patient Active Problem List    Diagnosis Date Noted     Controlled substance agreement signed 10/29/2015      Priority: High     Overview Note:     Signed in 2015 90, and  per month  Left hip and generalized osteoarthritis  Maximum 6 months between visits       Type 2 diabetes mellitus without complication      Priority: High     Overview Note:     Created by Conversion         Hyperlipidemia      Priority: High     Overview Note:     Created by Conversion         Nicotine Dependence      Priority: High     Overview Note:     Created by Conversion         Essential hypertension      Priority: High     Overview Note:     Created by Conversion         Acute blood loss anemia      Diet-controlled type 2 diabetes mellitus      Tobacco abuse      Degenerative joint disease (DJD) of hip 02/23/2016     Health care maintenance 02/03/2016     Trochanteric Bursitis      Overview Note:     Created by Conversion         Glaucoma      Overview Note:     Created by Conversion         Hypokalemia      Overview Note:     Created by Conversion         History   Smoking Status     Current Every Day Smoker     Packs/day: 0.25   Smokeless Tobacco     Not on file     Social History     Social History Narrative     Patient Care Team:  Wicho Aguayo MD as PCP - General  Liliam Salazar CMA as Clinic Care Coordination Care Guide (Clinic Care Coordination)  ROS  Full 10 system review including constitutional, respiratory, cardiac, gi, urinary, rheumatologic, neurologic, reproductive, dermatologic psychiatric is  performed (via questionnaire) and is negative except foot pain      Objective  Physical Exam  Vitals:    07/13/17 1026   BP: 112/70   Pulse: 86   Resp: 20   Temp: 97  F (36.1  C)   SpO2: (!) 80%   Weight: 133 lb (60.3 kg)   Height: 5' (1.524 m)     Very swollen second toe right foot.  Gen- alert, oriented/ appropriately responsive  HEENT- normal cephalic, atraumatic.   Chest- Normal inspiration and expiration.  Clear to ascultation.  No chest wall deformity or scar.  CV- Heart regular rate and rhythm, normal tones, no murmurs gallops  or rubs.  Ext- appear well perfused, no edema  Skin- warm and dry, no visualized rash    Diagnostics  Personally reviewed foot x-ray, shows comminuted fracture of middle phalanx at the PIP joint, displaced laterally, involvement of about 50% of the joint    Please note: Voice recognition software was used in this dictation.  It may therefore contain typographical errors.

## 2021-06-11 NOTE — PROGRESS NOTES
Patient Outreach follow up:  7/7/17: Call and spoke with pt. Went over goal. Pt have met her goal. Completed is completed per patient agreement. New goal is set as to help relax her mind and to keep pt busy.     Patient shared:  -Finally got my medicare medical insurance starting July 1, 2017. Medicare does not cover everything including medications.   -Still needs help with finding a supplement plan that Medicare does not pay for every thing.   -have been checking for supplement plan through many agency. She have found a medication plan through [employer]. [employer] have prescription plan for $22.50 a month and this is just for medication.   -[employer] have cheap payment for three different meds; including Tramadol.  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   -pt is getting social security income monthly. Pt is able to pay for part of the bill and medical bill.   -appetite and sleep is normal.  -walk a lot lately and it increase left hip pain. Still take Tramadol for pain. (note: see goal section for details-if need).    -have new plan to get at least 4 crochets and a few hand towels for friends and families. Plan to get at least 2 done before dianna. Will keep both legs stretch and move while crochets.   -will continue to go for a walk at least 3-4 times a week.   -Jaylyn, her good friends still bring pt to medical appt and do grocery shopping.   -pt is happy at this time for where is at.   -have no other pain or symptom.     Pt is informed, TAYLOR Marlow  is available to assist pt finding for supplement plan besides [employer] it she is not happy where she is at. Call clinic care guide for a appt if needs. Encouraged pt to stay where she is at if she is happy. Encouraged pt to f/u with pcp and take all meds a prescribe.   Pt confirmed and have no questions.    ______________________________________________________________________________________________________________________________________________________________________________________________________________________    Goal completed today:  Goal: I would like to keep myself motivated to be active in the following year. (Date goal set:  2/13/2015)  Action steps:  1. 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 My son has been paying all the bills. (Updated: 7/7/2017)    2. I am now get social security. I am able to help part of the bill and paying for medical bill. (Updated: 7/7/2017)    3. I get Medicare for medical insurance. Medicare does not cover for everything. I have found a plan through the [employer] program. I am on the plan that have prescription coverage for $22.50 a month. I have a cheap payment plan that is less than $20.00 a month for all three different medication; this is includes tramadol. (Updated: 7/7/17)    4. I still have pain. I had surgery on February 24, 2016 (Wednesday) for the left side hip replacement at 10:00 AM to 2:00 PM at [place], [place]. I still take Tramadol for my pain. Tramadol prescribed my Dr. Aguayo. For exercise activities, I have been walking a lot around my house. Walking a lot will increase pain on the left hip. (updated: 7/7/2017)     5. I have new new plan and interested to turn into a goal. I plans to dom at least 4 blankets Algerian designs and a few of towels before Jocelin time this year, 2017. The plan is to dom 4 blankets but I least I get 2 done before [place] time. The blankets is a gift to my families and friends. (Updated: 7/7/17)    6. I have been walking a lot lately around the [employer], bank, hardware store, and grocery. Mostly, Jaylyn, my good friend will bring me to do grocery shopping and medical appointments without having me paying for gas or pay her.  (Updated: 7/7/17)    7. I have my friend, Jaylyn dog  today. She is going on vacation for 7 days. I am a  for the next 6 days. I will walk the dog at least 1 hour a day for every (when I have the dog). I have a cat. (Updated: 7/7/17)    8. I have two beautiful grandson and one son who is helping me to do my chore in the house. My son is doing all my laundry for me and going up and down the stairs.  (date: 5/27/16)    9. I will continue to take my meds as prescribe. I will continue to follow up with Dr. Aguayo, pcp at [facility] for my health and meds concern. (Updated: 7/7/17)    10. I went to physical therapy for my arm pain. Sometime I feel good after going to PT and sometime not. I stopped going to physical therapy about month ago and I couldn't remember the exactly date. I stopped PT because PT clinic told me that I don't have medical insurance to covered for my PT visit and that is for my left arm. My left arm just have a lot of pain. The therapy wasn't really helping. (Date: 9/19/16)    11. I don't drive. My friend, Jaylyn will bring me to my medical appointment. She is my good friend for a very long time. I also have a good co-worker from work that is willing to help sometime. (Updated: 7/7/17)     12. I walk up and down the stair at my home every day. I do the laundry every week. I walk about 7 to 8 times a week for at least 30 minutes to 1 hour each time. Sometime is base on my health condition. If I don't feel well, I will decrease walking activities. (updated: 7/7/17)    14.  I am capable of keeping myself motivated and active. I have past my goal set time but at least I reach my goal, better than never. I request to complete this goal today. I will contact my pcp office and clinic care guide for any questions and appt needs. (updated:  7/7/17)  ______________________________________________________________________________________________________________________________________________________________________________________________________________________    New Goal:   Goal: I plans to dom 4 large blanket Kenyan blanca. I would like to get at least 2 blankets done before Jocelin time this year, 2017. (Date goal set:  July 7, 2017)    Future appointment:   -7/13/17 at 10:20 AM appt with Dr. Dede MD/pcp at [facility].

## 2021-06-12 NOTE — PROGRESS NOTES
Patient Outreach:  Reason: regard to telephone encounter dated 7/20/17 results.     Clinic care guide contact pt via phone and no answer. Left a message for pt to call back. [facility] [place] include.     Plan:  Waiting to hear from patient. Will address message to patient.

## 2021-06-14 NOTE — PROGRESS NOTES
Patient Outreach:  Spoke with patient via phone.   Go over her goal.   Checked on how patient is doing. Questioned for any urgent needs or help at this time.    Patient shared:  -Have 3 sons. No daughter. Have 3 beautiful grandchildren.   -Thanksgiving day is okay. One son is at the hospital having hip surgery on November 21st (Tuesday). Son will be discharge later today to home. One son is away from home.   -Still have pain on both knees, arms, and back. Take Tramadol (Ultram) 50 mg tablet for pain. Totally run out of Tramadol at this time. Have been taking Tylenol as alternative med for pain. Tylenol doesn't work. Prefer Tramadol. Pain level is rated at 8 today (Note: from a 0 to 10 pain scale level).   -Request message for med refill send to pcp for Tramadol and Aspirin 81 MG EC tablet. Aspirin almost run out; med will last for another 2 weeks.   -Prefer pharmacy is [facility] [address] [place], [place].    Patient is educated/informed for the following thing:  -Contact pharmacy for med refill for faster request.  -due for a follow up appt with pcp.  -Any questions, may contact care guide at [facility].  -Any questions regards to medication, the CA/CSS team will contact patient. Care Guide is not medically train for med.     Patient confirmed and have no questions.

## 2021-06-15 NOTE — PROGRESS NOTES
[facility] PRE-OPERATIVE VISIT FOR:    Olga Lidia Holm,  1950, MRN [id number]    Upcoming surgery date:   Surgeon: Riya  Surgery Facility: [facility]    Chief Complaint: Preop, left-sided corneal transplant    PCP: Wicho Aguayo MD, [phone]    SUBJECTIVE:  History of Present Illness:   Olga Lidia Holm is a 67 y.o. female     L corneal scarring due to postop infection from last year    Patient is a smoker who is not interested in quitting    She has diet-controlled diabetes into this point has been well-controlled on diet  Pressure well controlled on amlodipine alone  Chronic hip pain for which she takes tramadol under controlled substance agreement    Past Medical History:  Patient Active Problem List   Diagnosis     Glaucoma     Hypokalemia     Hyperlipidemia     Nicotine Dependence     Essential hypertension     Trochanteric Bursitis     Controlled substance agreement signed     Health care maintenance     Degenerative joint disease (DJD) of hip     Acute blood loss anemia     Diet-controlled type 2 diabetes mellitus     Osteoporosis     Past Surgical History:   Procedure Laterality Date     APPENDECTOMY       BREAST BIOPSY Left     benign      BREAST BIOPSY       CARPAL TUNNEL RELEASE       CATARACT EXTRACTION       CHOLECYSTECTOMY       HEMORRHOID SURGERY       [place]  DELIVERY ONLY      Description:  Section;  Recorded: 2009;     NC LIGATE FALLOPIAN TUBE      Description: Tubal Ligation;  Recorded: 2009;     [place] REMOVAL GALLBLADDER      Description: Cholecystectomy;  Recorded: 2009;     History of bleeding: No    History of tobacco use: Yes    History of anesthesia reactions: No    Social History:  she  reports that she has been smoking.  She has been smoking about 0.25 packs per day. She has never used smokeless tobacco. She reports that she does not drink alcohol or use illicit drugs.  Lives independently  Family History:  Family History   Problem  Relation Age of Onset     Heart disease Father      Diabetes Father      Ovarian cancer Mother      Endometrial cancer Mother      Breast cancer Neg Hx      Clotting disorder Neg Hx      Anesthesia problems Neg Hx        Current Medications:  Current Outpatient Prescriptions   Medication Sig Dispense Refill     acetaminophen (PAIN RELIEF) 650 MG CR tablet TAKE 2 TABLETS BY MOUTH THREE TIMES DAILY 540 tablet 0     amLODIPine (NORVASC) 10 MG tablet Take 1 tablet (10 mg total) by mouth daily. 30 tablet 0     aspirin 81 MG EC tablet Take 1 tablet (81 mg total) by mouth daily. 150 tablet 2     atropine 1 % ophthalmic solution INT 1 GTT IN OS TWICE DAILY. START 3 DAYS B SURGERY  1     biotin 5,000 mcg TbDL Take 1 tablet by mouth daily.  0     CALCIUM CARBONATE/VITAMIN D3 (CALCIUM 600 + D,3, ORAL) Take 1,200 mg by mouth daily.       cholecalciferol, vitamin D3, (VITAMIN D3) 2,000 unit Tab TAKE 1 TABLET BY MOUTH EVERY DAY 90 tablet 2     moxifloxacin (VIGAMOX) 0.5 % ophthalmic solution INT 1 GTT IN OS FOUR TIMES DAILY. START 1 DAY B SURGERY AND U UNTIL BOTTLE IS FINISHED  1     pravastatin (PRAVACHOL) 40 MG tablet ALTERNATE 2 TABLETS MY MOUTH AND 1 TABLET EVERY OTHER  tablet 2     prednisoLONE acetate (PRED-FORTE) 1 % ophthalmic suspension SHAKE LQ AND INT 1 GTT IN OS FOUR TIMES DAILY. START 1 DAY B SURGERY  1     traMADol (ULTRAM) 50 mg tablet TAKE 1 TABLET(50 MG) BY MOUTH EVERY 6 HOURS AS NEEDED FOR PAIN 150 tablet 0     amLODIPine (NORVASC) 10 MG tablet TAKE 1 TABLET(10 MG) BY MOUTH DAILY 90 tablet 3     No current facility-administered medications for this visit.        Allergies:  Lisinopril and Tetracyclines    Review or Systems:  Full 10 system review including constitutional, respiratory, cardiac, gi, urinary, rheumatologic, neurologic, reproductive, dermatologic psychiatric is  performed (via questionnaire) and is negative except decreased hearing right side, thinks ears plugged, change in vision as  noted    OBJECTIVE:  Vitals:    02/09/18 1028   BP: 130/62   Pulse: 97   Resp: 20   Temp: 98.5  F (36.9  C)   SpO2: 91%     General Appearance:    Alert, cooperative, no distress, appears stated age   Head:    Normocephalic, without obvious abnormality, atraumatic  Right ear canal is plugged with cerumen, left ear canal is clear.  Attempted to remove this manually for correct which was ineffective.  Successfully irrigated today by staff.   Eyes:    PERRL, conjunctiva/corneas clear, EOM's intact   Nose:   Mucosa moist, normal    Throat:   Lips, mucosa, and tongue normal; ora phaynx clear   Neck:   Supple, symmetrical, trachea midline, no adenopathy;     thyroid:  no enlargement/tenderness/nodules; no carotid    bruit or JVD   Lungs:     Clear to auscultation bilaterally, respirations unlabored    Heart:    Regular rate and rhythm, S1 and S2 normal, no murmur, rub   or gallop   Abdomen:     Soft, non-tender, bowel sounds active all four quadrants,     no masses, no organomegaly   Extremities:   Extremities normal, atraumatic, no cyanosis or edema       Skin:   Skin color, texture, turgor normal, no rashes or lesions   Neurologic:   No focal deficits         Labs:  Potassium, LDL cholesterol, ALT, creatinine and A1c are pending.  I will forward these results when they are available    EKG personally reviewed.  Normal sinus rhythm with a rate of 68, borderline leftward axis on the nonpathologic.  Normal intervals, no sign of hypertrophy.  Normal QRS complexes, abnormal R-wave progression, question lead failure lead V2.  ST segment is normal  ASSESSMENT/PLAN:  Acceptable candidate for surgery  Low to intermediate risk surgery, patient without high [place]ry cardiac risk factors though a number of secondary risk factors in age, hypertension, smoking and diabetes.  Acceptable functional capacity  No further testing    Recommend holding aspirin for 10 days before surgery.  Restarting when okay with surgeons.  Advised smoking  cessation again  Wicho Aguayo MD

## 2021-06-16 PROBLEM — N12 PYELONEPHRITIS: Status: ACTIVE | Noted: 2019-01-01

## 2021-06-16 PROBLEM — G47.00 PERSISTENT INSOMNIA: Status: ACTIVE | Noted: 2018-01-01

## 2021-06-16 PROBLEM — M25.569 ANTERIOR KNEE PAIN, UNSPECIFIED LATERALITY: Status: ACTIVE | Noted: 2019-01-01

## 2021-06-16 PROBLEM — L65.9 HAIR LOSS: Status: ACTIVE | Noted: 2018-01-01
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
## 2021-06-16 PROBLEM — M79.10 MYALGIA: Status: ACTIVE | Noted: 2018-01-01

## 2021-06-16 PROBLEM — R41.3 MEMORY CHANGES: Status: ACTIVE | Noted: 2019-01-01

## 2021-06-16 PROBLEM — E83.52 HYPERCALCEMIA: Status: ACTIVE | Noted: 2019-01-01

## 2021-06-16 PROBLEM — R51.9 HEADACHE: Status: ACTIVE | Noted: 2018-01-01

## 2021-06-16 PROBLEM — M81.0 OSTEOPOROSIS: Status: ACTIVE | Noted: 2017-07-19

## 2021-06-16 PROBLEM — G40.901 STATUS EPILEPTICUS (H): Status: ACTIVE | Noted: 2019-01-01

## 2021-06-16 PROBLEM — I87.2 VENOUS STASIS DERMATITIS OF BOTH LOWER EXTREMITIES: Status: ACTIVE | Noted: 2019-01-01

## 2021-06-16 PROBLEM — R26.9 ABNORMAL GAIT: Status: ACTIVE | Noted: 2019-01-01

## 2021-06-16 PROBLEM — M75.82 ROTATOR CUFF TENDINITIS, LEFT: Status: ACTIVE | Noted: 2018-01-01

## 2021-06-16 PROBLEM — M25.551 HIP PAIN, RIGHT: Status: ACTIVE | Noted: 2019-01-01

## 2021-06-16 PROBLEM — N17.9 AKI (ACUTE KIDNEY INJURY) (H): Status: ACTIVE | Noted: 2019-01-01

## 2021-06-16 NOTE — TELEPHONE ENCOUNTER
Telephone Encounter by Georgie Beaver MD at 2/26/2019  5:24 PM     Author: Georgie Beaver MD Service: -- Author Type: Physician    Filed: 2/26/2019  5:29 PM Encounter Date: 2/26/2019 Status: Signed    : Georgie Beaver MD (Physician)       Rx refilled.  Needs to be seen prior to next refill.    Last Visit  1/10/2019 Wicho Aguayo MD - was told to come back in 4 weeks.  No future appointments.    Controlled Substance Agreement  On file (>1 year ago).    Last Drug Screen  Due.    [place]  Review

## 2021-06-17 NOTE — PROGRESS NOTES
Care Guide called patient.  If this patient is returning my call, please transfer to [phone].    No upcoming appt per pt chart reviewed.

## 2021-06-17 NOTE — PROGRESS NOTES
Patient Outreach:   Reviewed patient chart. Discussed encounter dated 4/12/18 with MANPREET Linares in case patient ask during patient outreach. Still waiting for pcp to advised.     Call pt and spoke with her.   Per patient; not available to talk right now. Still in a middle of a show.   Note: patient phone, have background echoing; can't really hear patient clear.   Informed will call pt in a month or different day. Pt confirmed.

## 2021-06-18 NOTE — PROGRESS NOTES
Patient Outreach:   Care Guide called patient.  If this patient is returning my call, please transfer to [phone].     Future appointment per appt desk/chart reviewed: as of 5/18/18 at 4:23PM, no appt.     Plan:   Next outreach due in two weeks.

## 2021-06-18 NOTE — PROGRESS NOTES
Patient Outreach Follow Up:   Writer call pt today to follow up on goal and check on most/urgent need. Call pt and no answer. Left a message for pt to return call. Writer phone # is provide.     As of 6-13-18 at 3:21PM, pt have no upcoming appt per pt appt desk reviewed.     Note/FYI:   Writer is off between end of May to beginning of June 2018.     Plan:   Next outreach due in one month.

## 2021-06-19 NOTE — ANESTHESIA POSTPROCEDURE EVALUATION
Patient: Olga Lidia Holm  RIGHT TEMPORAL ARTERY BIOPSY  Anesthesia type: MAC    Patient location: Phase II Recovery  Last vitals:   Vitals:    07/24/18 1300   BP: 135/72   Pulse: 86   Resp: 16   Temp: 36.7  C (98  F)   SpO2: 93%     Post vital signs: stable  Level of consciousness: awake and responds to simple questions  Post-anesthesia pain: pain controlled  Post-anesthesia nausea and vomiting: no  Pulmonary: unassisted, return to baseline  Cardiovascular: stable and blood pressure at baseline  Hydration: adequate  Anesthetic events: no    QCDR Measures:  ASA# 11 - Danni-op Cardiac Arrest: ASA11B - Patient did NOT experience unanticipated cardiac arrest  ASA# 12 - Danni-op Mortality Rate: ASA12B - Patient did NOT die  ASA# 13 - PACU Re-Intubation Rate: ASA13B - Patient did NOT require a new airway mgmt  ASA# 10 - Composite Anes Safety: ASA10A - No serious adverse event    Additional Notes:

## 2021-06-19 NOTE — ANESTHESIA CARE TRANSFER NOTE
Last vitals:   Vitals:    07/24/18 1212   BP: 125/72   Pulse: 74   Resp: 20   Temp: 36.7  C (98.1  F)   SpO2: 99%     Patient's level of consciousness is awake and drowsy  Spontaneous respirations: yes  Maintains airway independently: yes  Dentition unchanged: yes  Oropharynx: oropharynx clear of all foreign objects    QCDR Measures:  ASA# 20 - Surgical Safety Checklist: WHO surgical safety checklist completed prior to induction  PQRS# 430 - Adult PONV Prevention: 4558F - Pt received => 2 anti-emetic agents (different classes) preop & intraop  ASA# 8 - Peds PONV Prevention: NA - Not pediatric patient, not GA or 2 or more risk factors NOT present  PQRS# 424 - Danni-op Temp Management: 4559F - At least one body temp DOCUMENTED => 35.5C or 95.9F within required timeframe  PQRS# 426 - PACU Transfer Protocol: - Transfer of care checklist used  ASA# 14 - Acute Post-op Pain: ASA14B - Patient did NOT experience pain >= 7 out of 10

## 2021-06-19 NOTE — LETTER
Letter by Wicho Aguayo MD at      Author: Wicho Aguayo MD Service: -- Author Type: --    Filed:  Encounter Date: 4/4/2019 Status: (Other)         Olga Lidia Holm  [address] [place] [address]             April 4, 2019         Dear Ms. Holm,    Below are the results from your recent visit:    Resulted Orders   Glycosylated Hemoglobin A1c   Result Value Ref Range    Hemoglobin A1c 6.2 (H) 3.5 - 6.0 %   ALT (SGPT)   Result Value Ref Range    ALT 10 0 - 45 U/L   LDL Cholesterol, Direct   Result Value Ref Range    Direct  (H) <=129 mg/dl       Liver and cholesterol test look good.    Please call with questions or contact us using [facility].    Sincerely,        Electronically signed by Wicho Aguayo MD

## 2021-06-19 NOTE — PROGRESS NOTES
"Patient Outreach Follow Up:   Writer called pt regards to goal follow up. Called and spoke with pt. Checked most need. Pt met and request goal completed. Set new goal. Patient shared/stated info below. Refer pt to apply the Financial [employer] application/program. Offered help. Pt declined and stated needs to take care of myself and family prior take care of any medical bill and will contact  when ready. Encouraged pt f/u with Dr. Moon, surgeon and pcp as recommended. Contact pcp or care guide office is symptom is worsen. Pt confirmed and no questions.     New goal:   Goal: I would like to find ways to reduced my headaches.     Patient shared/stated:   -have had the head temporal artery biopsy procedure on 07/24/18. Everything goes smoothly. Recovery is quick and fast. Request complete goal.   -taking Ondansetran (zofran) 4 MG table as PRN for nausea.    -still has headaches; pain level is pretty bad; take med for it. No fun with headaches.   -Can't afford going to doctor's office to take care of my headaches. Still owe [facility] about $1,250.00 medical bill; not as bad with [facility].   -breathing is normal. No shortness of breath.   -\"On Prednison 80 mg PO daily. Should be done with this med in one week.\"   -eating is fine. Sometime feel \"foggy\" because of headaches and feel like not want to eat.   -Use to wake up between every 1-2 hour and feel tired in the morning. Now, able to sleep about 6-7 hours a night; sleep throughout the night. Sleep is improve.   -\"Esther Alvarado my grandson's girlfriend employ and help with housing mortgage. I try not to take any more money from her. She get pay really low like $7.50 an hour. She helped $200.00 after first week moved in. She's a sweetheart and I will be called her my adopted grand-daughter.\"   -have my two grand-sons, my son, and Esther Alvarado at home. \"Sweet family.\" Wants to be the best mom and grandma for them.   -want to find ways to reduced headaches, stress, " and depression. Set as new goal.   -have further questions or concerns.     Plan:   Next outreach follow up due in three months.

## 2021-06-19 NOTE — PROGRESS NOTES
Spoke with Olga Lidia about her negative temporal artery biopsy.  She has now had this and a negative MRI    Still has headaches, not as bad.  Adjusted neurology as the next step.  She does not want to do this for financial concerns and transportation concerns.  She will contact us when she is ready    In the meantime, she needs to get off the prednisone gradually.  She currently is on 60 mg.  We will go down to 40, 30, 20 all for 3 days each then 10 mg for 7 days then stop.  She voiced understanding.

## 2021-06-19 NOTE — PROGRESS NOTES
"Patient Outreach Follow Up:   CG contact pt today regards to outreach follow up. Spoke with patient. Checked with patient for most/urgent needs. Update goal and action steps. Pt past set time for goal and did not meet goal due to health condition. Current goal deleted per pt request. New goal add  per patient. Comfort not to nervous about her surgery appointment tomorrow. Wish pt for a fast recovery surgery (see chart for info) and follow up with surgeon or pcp as recommend. Checked with patient for household size. Offered to assisted pt with  service to help patient at home due to medical health condition. Patient declined. Pt shared info below. Informed pt to contact pcp office and surgeon office for any questions regards to health and surgery procedure; contact [facility] Service to assist with resources, appt, and other questions.     Goal deleted:   Goal: I plans to dom 4 large blanket Haitian designs. I would like to get at least 2 blankets done before Jocelin time this year, 2017.    Patient stated; \"I am surprised. I was just informed this morning. They will perform surgery on both side of my head tomorrow. I am very nervous about it.\"      Offered to assist pt contact the [facility]/surgeon clinic or pcp for advice to comfort her. Pt declined. Pt says,\"I will meet with the surgeon and his team tomorrow. Though, I am very nervous.\"     Patient shared:   -\"I have my two grand-sons, my son, and I live in the same house.\"  -\"A female Hmong girl is living with us a this month. She is a friend my grandson. I don't expect her to help us. She cooked good food for us last week.\"   -I get little disability income monthly. (FYI: did not asked pt for total of monthly income).   -My son work. He take care of most bill.   -keeping myself busy so I will not think of my pain and headache. Things I do is bake banana bread, making dinner for my family, and chores.   -2 grandson's and my son helped with needs at home. " "  -headache is \"terrible and on a high does med.\" Headache still bad. Will be having temporary artery surgery tomorrow; both side of my head. Set as new goal.     Plan:   Will follow up with patient two weeks from now.             "

## 2021-06-19 NOTE — PROGRESS NOTES
Call today from ophthalmologist, Dr. Ritter at [facility].  She noted concern about temporal arteritis, some increase in sed rate.  Felt she should be treated and biopsied.  Will arrange this through [facility].  Asked her to come in tomorrow for preop

## 2021-06-19 NOTE — PROGRESS NOTES
Assessment/ Plan  Problem List Items Addressed This Visit        High    Hyperlipidemia     Pravastatin 40, check CK due to myalgias         Relevant Medications    pravastatin (PRAVACHOL) 40 MG tablet    Controlled substance agreement signed     Inadequate time today to address substance agreement    Continue 5 tramadol per day.  Discussed possibility of increasing this due to uptake in pain lately  Refill ordered              Unprioritized    Health care maintenance     Tetanus and pneumococcal vaccine ordered         Diet-controlled type 2 diabetes mellitus (H)     Assessment of blood sugar control: Good  Lab Results   Component Value Date    HGBA1C 6.0 07/12/2018     Diabetes medication: None  Statin medication (>40 or ^CVD Risk)-pravastatin 40  At blood pressure goal (JNC 8 <140/90 all ages): Yes  Lab Results   Component Value Date    MICROALBUR 0.84 07/13/2017     Ace/arb indicated and taking?  No, intolerant, on amlodipine 10  Low dose ASA? (indicated for most men> 50, most women > 60 if any other card RF yes  Up to date with yearly dilated retina eval?  Yes    Plan: No change  Follow-up: 6 month           Relevant Orders    Glycosylated Hemoglobin A1c (Completed)    Myalgia - Primary     Some headache, worked up for temporal arteritis by ophthalmology, at least it sounds like that.  Await CK and ESR.  On mid dose pravastatin         Relevant Orders    Erythrocyte Sedimentation Rate (Completed)    CK Total (Completed)    Headache     Bitemporal,  No red flags but does not fit clear pattern  Exception to no red flags is age  Brisk reflexes  MRI             Relevant Orders    MR Brain With Without Contrast    Rotator cuff tendinitis, left     X-ray negative  Patient reports physical therapy previously ineffective  Discussed option of injection and limited benefit  Patient accepted  Subacromial corticosteroid injection of left shoulder  Discussed risk and benefits.  -Risks being but not limited to to infection,  "bleeding and unlikely pneumothorax  -Benefit being limited to no more than 4 weeks of reduced shoulder pain.    Patient agreed/requested procedure  Identified region 1 cm inferior, 1 cm medial to posterior angle of the acromion  Prepped this with alcohol.  27-gauge 1-1/4 needle advanced parallel to the acromion, care taken not to drive the needle inferior  Injected 1 cc depomedrol 40 + 1 cc lidocaine  Patient tolerated the procedure well           Relevant Medications    lidocaine 10 mg/mL (1 %) injection 1 mL (Completed)    methylPREDNISolone acetate injection 40 mg (DEPO-MEDROL) (Completed)      Other Visit Diagnoses     Pain        Relevant Medications    traMADol (ULTRAM) 50 mg tablet    Other Relevant Orders    XR Shoulder Left 2 or More VWS (Completed)        Subjective  CC:  Chief Complaint   Patient presents with     pain in upper Arms/shoulders     headaches/pressure in temples     HPI:  Generalized Pain  Narrative: L shoulder pain began 2 months ago  R>>>L,  Also with some headache  Saw ophthalmologist, concerned about dilated veins in head according to patient.  Blood test done to \"make sure she would not go blind\"  Duration/ onset: 2 months  Location/distrobution: Again, left greater than right shoulder, some headache  Quality/Severity: Pretty severe pain with movement left shoulder/headache throbbing  Timing/ context: Comes and goes  Modifying factors: make it worse: Movement of shoulder, unclear about head make it better rest  Associated symptoms: Denies fever chills  Current/ Previous treatment: Had physical therapy for shoulder 2 years ago      Headache  Duration- 2 months/ pattern/   Ophthalmology 2 months ago  Location- bitemporal  Severity/quality-throbbing  Associated symptoms-shoulder pain  Timing/context-random difficult historian, hard to tell if there is a pattern  Things that make pain better? tramadol  Nausea/vomiting? Nausea no vomiting  Photophobia?no    Red flag symptoms; overnight " awakening, worsening with valsalva or orthostatic change, change in behavior or mood, gradually worsening pattern?no      DMII    Lab Results   Component Value Date    HGBA1C 6.2 (H) 02/09/2018     Diabetes medications reviewed- compliance: No medication to lower blood sugar    Additional diabetes objectives reviewed   Statin medication (>40 or ^CVD Risk) pravastatin  Blood pressure goal (JNC 8 <140/90 all ages)-yes  BP Readings from Last 3 Encounters:   07/12/18 128/82   02/09/18 130/62   07/13/17 112/70     Lab Results   Component Value Date    MICROALBUR 0.84 07/13/2017     Ace/ARB if indicated-no  Low dose ASA? (indicated for most men> 50, most women > 60 if any other card RF yes  Yearly dilated retina evaluation -yes, recently saw ophthalmology    Blood Sugar Control  Does not check  Comments (diet/exercise, working with diabetes educator etc): No  Wt Readings from Last 3 Encounters:   07/12/18 130 lb (59 kg)   02/09/18 132 lb (59.9 kg)   07/13/17 133 lb (60.3 kg)     Follow-up on lipids, labs checked 2/9      Chronic pain management     Type/ source/ location of pain being treated: Primarily ordered for hip pain  Medications/ dosage prescribed/ number of tabs/ month/ intervals between visits:   Tramadol /150 per month/6 months    Controlled substance contract signed in the last year?  No, she would be due      Pain Control Assessment  PEG Score (30% improvement is considered significant)        Assessment of risk of harm/ misuse:  - oversedation?  Low risk, due to tramadol  -checked PDMP?  No  -difficulty controlling use?  No  Last urine tox screen: Have not done, low risk                      PFSH:  Patient Active Problem List   Diagnosis     Glaucoma     Hypokalemia     Hyperlipidemia     Nicotine Dependence     Essential hypertension     Trochanteric Bursitis     Controlled substance agreement signed     Health care maintenance     Degenerative joint disease (DJD) of hip     Acute blood loss anemia      Diet-controlled type 2 diabetes mellitus (H)     Osteoporosis     Myalgia     Headache     Rotator cuff tendinitis, left     Current medications reviewed as follows:  Current Outpatient Prescriptions on File Prior to Visit   Medication Sig     acetaminophen (PAIN RELIEF) 650 MG CR tablet TAKE 2 TABLETS BY MOUTH THREE TIMES DAILY (Patient taking differently: TAKE 2 TABLETS BY MOUTH THREE TIMES DAILY. Pt usually takes 1-2 times per day.)     amLODIPine (NORVASC) 10 MG tablet Take 1 tablet (10 mg total) by mouth daily.     aspirin 81 MG EC tablet Take 1 tablet (81 mg total) by mouth daily.     biotin 5,000 mcg TbDL Take 1 tablet by mouth daily.     CALCIUM CARBONATE/VITAMIN D3 (CALCIUM 600 + D,3, ORAL) Take 1,200 mg by mouth daily.     cholecalciferol, vitamin D3, (VITAMIN D3) 2,000 unit Tab TAKE 1 TABLET BY MOUTH EVERY DAY     prednisoLONE acetate (PRED-FORTE) 1 % ophthalmic suspension SHAKE LQ AND INT 1 GTT IN OS FOUR TIMES DAILY. START 1 DAY B SURGERY     [DISCONTINUED] pravastatin (PRAVACHOL) 40 MG tablet ALTERNATE 2 TABLETS MY MOUTH AND 1 TABLET EVERY OTHER DAY     [DISCONTINUED] traMADol (ULTRAM) 50 mg tablet 1-2 tablets by mouth q6hrs prn for pain.     atropine 1 % ophthalmic solution INT 1 GTT IN OS TWICE DAILY. START 3 DAYS B SURGERY     moxifloxacin (VIGAMOX) 0.5 % ophthalmic solution INT 1 GTT IN OS FOUR TIMES DAILY. START 1 DAY B SURGERY AND U UNTIL BOTTLE IS FINISHED     [DISCONTINUED] amLODIPine (NORVASC) 10 MG tablet Take 1 tablet (10 mg total) by mouth daily.     No current facility-administered medications on file prior to visit.      History   Smoking Status     Current Every Day Smoker     Packs/day: 0.25   Smokeless Tobacco     Never Used     Social History     Social History Narrative     Patient Care Team:  Wicho Aguayo MD as PCP - General  Liliam Salazar CMA as Clinic Care Coordination Care Guide (Clinic Care Coordination)  ROS  Full 10 system review including constitutional, respiratory,  cardiac, gi, urinary, rheumatologic, neurologic, reproductive, dermatologic psychiatric is  performed (via questionnaire) and is negative except sleep difficulties due to shoulder, nausea from the headaches        Objective  Physical Exam  Vitals:    07/12/18 0944   BP: 128/82   Patient Site: Right Arm   Patient Position: Sitting   Cuff Size: Adult Regular   Pulse: 80   Temp: 98.2  F (36.8  C)   TempSrc: Oral   Weight: 130 lb (59 kg)     Body mass index is 26.04 kg/(m^2).  Gen- alert, oriented ×3  No acute distress  Chest- Normal inspiration and expiration.    Clear to ascultation.    No chest wall deformity or scar.  CV- Heart regular rate and rhythm  normal tones   no murmurs   No gallops or rubs.  Ext- warm and dry   no edema  Skin-  no visualized rash  Foot monofilament test not performed-    Cranial nerves II through XII intact.  Optic fundi normal.  No papilledema.  Strength tested in upper extremities and is normal.  Coordination intact.  Gait normal.  Romberg negative.  Triceps, biceps, patellar, ankle reflexes tested and are normal but brisk throughout  Symmetric musculature of shoulder muscles and scapular muscles when viewed from behind.  No pain to palpation on AC joint coracoid process and glenohumeral joint on affected shoulder.  Passive range of motion to 90  is normal and pain-free.  Negative apprehension test.    There is no pain on resisted internal rotation and intact strength.  There is mild pain on resisted external rotation and intact strength  Empty can sign is very positive    Diagnostics:   Shoulder x-ray was reviewed and is negative.  She does have lung nodules    Results for orders placed or performed in visit on 07/12/18   Glycosylated Hemoglobin A1c   Result Value Ref Range    Hemoglobin A1c 6.0 3.5 - 6.0 %   Erythrocyte Sedimentation Rate   Result Value Ref Range    Sed Rate 29 (H) 0 - 20 mm/hr   CK Total   Result Value Ref Range    CK, Total 71 30 - 190 U/L       Please note: Voice  recognition software was used in this dictation.  It may therefore contain typographical errors.

## 2021-06-19 NOTE — ANESTHESIA PREPROCEDURE EVALUATION
Anesthesia Evaluation      Patient summary reviewed   No history of anesthetic complications     Airway   Mallampati: II  Neck ROM: full   Pulmonary - negative ROS and normal exam   (+) decreased breath sounds, a smoker                         Cardiovascular - normal exam  (+) hypertension, ,      Neuro/Psych - negative ROS     Endo/Other    (+) diabetes mellitus,      GI/Hepatic/Renal - negative ROS      Other findings: Diet controlled DM Smoker's voice, she did smoke this morning and sounds fairly hoarse but breath sounds are clear to auscultation      Dental    (+) lower dentures and upper dentures                       Anesthesia Plan  Planned anesthetic: MAC    ASA 2     Anesthetic plan and risks discussed with: patient and child/children    Post-op plan: routine recovery

## 2021-06-19 NOTE — PROGRESS NOTES
[facility] PRE-OPERATIVE VISIT FOR:    Olga Lidia Holm,  1950, MRN [id number]    Upcoming surgery date:   Surgeon: Ronnie  Surgery Facility: [facility]    Chief Complaint: Preop    PCP: Wicho Aguayo MD, [phone]    SUBJECTIVE:  History of Present Illness:   Olga Lidia Holm is a 67 y.o. female with  2 month history of bitemporal headache, throbbing associated with some shoulder pain.  Mild increase in sed rate over baseline, 29.  MRI ordered and done  which was negative.    Patient had some improvement on low-dose prednisone, 20 mg.  I spoke with Dr. Ritter, her my ophthalmologist yesterday and we agreed that temporal artery biopsy would be indicated.    Patient was started on 80 mg of prednisone     History of well-controlled, diet-controlled type 2 diabetes, nicotine dependence, essential hypertension and hyperlipidemia.  She lives alone.  She otherwise is in her usual state of health.    She had a cornea transplant done in the last year    Past Medical History:  Patient Active Problem List   Diagnosis     Glaucoma     Hypokalemia     Hyperlipidemia     Nicotine Dependence     Essential hypertension     Trochanteric Bursitis     Controlled substance agreement signed     Health care maintenance     Degenerative joint disease (DJD) of hip     Acute blood loss anemia     Diet-controlled type 2 diabetes mellitus (H)     Osteoporosis     Myalgia     Headache     Rotator cuff tendinitis, left     Past Surgical History:   Procedure Laterality Date     APPENDECTOMY       BREAST BIOPSY Left     benign      BREAST BIOPSY       CARPAL TUNNEL RELEASE       CATARACT EXTRACTION       CHOLECYSTECTOMY       HEMORRHOID SURGERY       LA  DELIVERY ONLY      Description:  Section;  Recorded: 2009;     LA LIGATE FALLOPIAN TUBE      Description: Tubal Ligation;  Recorded: 2009;     [place] REMOVAL GALLBLADDER      Description: Cholecystectomy;  Recorded: 2009;      Any history of anesthesia reaction no  Do you have difficulty breathing or chest pain after walking up a flight of stairs: No  History of obstructive sleep apnea: No  Steroid use in the last 6 months: Yes: Currently on 80 mg of prednisone, has been on steroid for less than a week  Frequent Aspirin/NSAID use: No  Prior Blood Transfusion: No  Prior Blood Transfusion Reaction: No  If for some reason prior to, during or after the procedure, if it is medically indicated, would you be willing to have a blood transfusion?:  There is no transfusion refusal.    Social History:  she  reports that she has been smoking.  She has been smoking about 0.25 packs per day. She has never used smokeless tobacco. She reports that she does not drink alcohol or use illicit drugs.    Family History:  Family History   Problem Relation Age of Onset     Heart disease Father      Diabetes Father      Ovarian cancer Mother      Endometrial cancer Mother      Breast cancer Neg Hx      Clotting disorder Neg Hx      Anesthesia problems Neg Hx        Current Medications:  Current Outpatient Prescriptions   Medication Sig Dispense Refill     acetaminophen (PAIN RELIEF) 650 MG CR tablet TAKE 2 TABLETS BY MOUTH THREE TIMES DAILY (Patient taking differently: TAKE 2 TABLETS BY MOUTH THREE TIMES DAILY. Pt usually takes 1-2 times per day.) 540 tablet 0     amLODIPine (NORVASC) 10 MG tablet Take 1 tablet (10 mg total) by mouth daily. 90 tablet 2     biotin 5,000 mcg TbDL Take 1 tablet by mouth daily.  0     CALCIUM CARBONATE/VITAMIN D3 (CALCIUM 600 + D,3, ORAL) Take 1,200 mg by mouth daily.       cholecalciferol, vitamin D3, (VITAMIN D3) 2,000 unit Tab TAKE 1 TABLET BY MOUTH EVERY DAY 90 tablet 2     lidocaine (LIDODERM) 5 % Apply to skin daily, leave in place for 12-18 hour and remove.  Repeat daily 30 patch 11     moxifloxacin (VIGAMOX) 0.5 % ophthalmic solution INT 1 GTT IN OS FOUR TIMES DAILY. START 1 DAY B SURGERY AND U UNTIL BOTTLE IS FINISHED  1      pravastatin (PRAVACHOL) 40 MG tablet ALTERNATE 2 TABLETS MY MOUTH AND 1 TABLET EVERY OTHER DAY 90 tablet 3     prednisoLONE acetate (PRED-FORTE) 1 % ophthalmic suspension SHAKE LQ AND INT 1 GTT IN OS FOUR TIMES DAILY. START 1 DAY B SURGERY  1     predniSONE (DELTASONE) 10 mg tablet Take 2 tablets (20 mg total) by mouth daily for 5 days. 10 tablet 0     predniSONE (DELTASONE) 20 MG tablet 80 mg po daily for 7 days, 60 p.o. daily for 7 days, 40 p.o. daily for 7 days, 20 p.o. daily for 7 days, 10 p.o. daily for 7 days then stop 74 tablet 0     traMADol (ULTRAM) 50 mg tablet 1-2 tablets by mouth q6hrs prn for pain. 150 tablet 2     atropine 1 % ophthalmic solution INT 1 GTT IN OS TWICE DAILY. START 3 DAYS B SURGERY  1     No current facility-administered medications for this visit.        Allergies:  Lisinopril and Tetracyclines    Review or Systems:  Constitution: normal  HEENT: normal  Pulmonary: normal  Cardiovascular: normal  GI: normal  : normal  Musculoskeletal: normal  Neuro: normal  Endocrine: normal  Psych: normal  Lymph/Heme: normal    OBJECTIVE:  Vitals:    07/20/18 1100   BP: 144/68   Pulse: 90   Resp: 16   Temp: 98.1  F (36.7  C)   SpO2: 96%     General Appearance:    Alert, cooperative, no distress, appears stated age   Head:    Normocephalic, without obvious abnormality, atraumatic   Eyes:    PERRL, conjunctiva/corneas clear, EOM's intact   Nose:   Mucosa moist, normal    Throat:   Lips, mucosa, and tongue normal; ora phaynx clear   Neck:   Supple, symmetrical, trachea midline, no adenopathy;     thyroid:  no enlargement/tenderness/nodules; no carotid    bruit or JVD   Lungs:     Clear to auscultation bilaterally, respirations unlabored    Heart:    Regular rate and rhythm, S1 and S2 normal, no murmur, rub   or gallop   Abdomen:     Soft, non-tender, bowel sounds active all four quadrants,     no masses, no organomegaly   Extremities:   Extremities normal, atraumatic, no cyanosis or edema   Pulses:    2+ and symmetric all extremities   Skin:   Skin color, texture, turgor normal, no rashes or lesions   Neurologic:   No focal deficits         Labs:  Results for orders placed or performed in visit on 07/12/18   Glycosylated Hemoglobin A1c   Result Value Ref Range    Hemoglobin A1c 6.0 3.5 - 6.0 %   Erythrocyte Sedimentation Rate   Result Value Ref Range    Sed Rate 29 (H) 0 - 20 mm/hr   CK Total   Result Value Ref Range    CK, Total 71 30 - 190 U/L     Sed rate and potassium are pending  ASSESSMENT/PLAN:  Acceptable preop surgical candidate  Low risk surgery, good functional capacity  Amlodipine alone morning of surgery    Hemoglobin and potassium are currently pending.        Wicho Aguayo MD

## 2021-06-21 NOTE — PROGRESS NOTES
Assessment/ Plan  Problem List Items Addressed This Visit        High    Controlled substance agreement signed     Mistakenly prescribed 150 tablets today which would be about enough for a month and two thirds.  Intention was 1 full month with 2 refills.  Will contact her to discuss.            Unprioritized    Hypokalemia     Found, etiology not entirely clear, not on diuretics.  Has been on corticosteroids but not currently.  Potassium chloride 20 mEq twice daily prescribed by neurologist, recheck basic metabolic profile today         Relevant Orders    Potassium (Completed)    Degenerative joint disease (DJD) of hip     Renew tramadol,, see controlled substance agreement         Headache - Primary     Currently following with [facility] clinic, apparently temporal arteritis has been ruled out by combination of ultrasound and biopsy.  Reviewed note from 9/11.  More recent note not available but it sounds like she is being treated for chronic daily headache.  I am somewhat reluctant to add tramadol which she is been on in the past for this but I do not think, given her narcotic contract and good pain relief that we have a choice.  Recommend she restart her amitriptyline at low dose.  Reviewed recommendations for chronic daily headache including regular exercise, smoking cessation, adequate fluid intake.  So sleep hygiene.         Persistent insomnia     Discussed option of CBT, patient declined  Not getting good results from amitriptyline  Complaining about polypharmacy already, I do not think addition of medication is good option         Hair loss     Appears fairly mild, androgenic.  Reassured but check TSH         Relevant Orders    Thyroid [place] (Completed)    RESOLVED: Pain        Subjective  CC:  Chief Complaint   Patient presents with     Follow-up     Neurology     HPI:  67-year-old with history of smoking, hypertension, chronic hip pain for which she receives tramadol, diet-controlled diabetes, osteoporosis,  hyperlipidemia.  She presents for follow-up.    She is been seen for the last few months for temporal headache.  See my last few clinic visits for notes regarding this.  Eventually was referred to Dr. BUENROSTRO at [facility].  Spite negative temporal artery biopsy, he was concerned about occult temporal arteritis.  Ultrasound was done of her temporal arteries which is apparently normal.  I was able to review his note from 9/10/18 but unable to see his more recent note from 10/23 though I can see that she had a low potassium at 2.8.  He started her on potassium chloride 20 mEq twice daily.    Her headache is been doing pretty well lately.  She is upset about a number of things:    First, the amitriptyline he gave her may be helped a little at the beginning but as she increase the dose up to 50 mg she got very tired.  About 1 week ago she stopped taking this altogether because of this.  Apparently, she was instructed to go up to the level at which she got some benefit but was not tired.    Next, she complains of chronic insomnia.  This is been going on for a long time but.  She notes that the amitriptyline does not help with this.  She is been on another medication in the past which does not help.  She lays in bed and thinks.  Wonders what she can do about this.    Needs a refill on her tramadol.  Apparently was accidentally out of this medication for some time.        PFSH:  Patient Active Problem List   Diagnosis     Glaucoma     Hypokalemia     Hyperlipidemia     Nicotine Dependence     Essential hypertension     Trochanteric Bursitis     Controlled substance agreement signed     Health care maintenance     Degenerative joint disease (DJD) of hip     Acute blood loss anemia     Diet-controlled type 2 diabetes mellitus (H)     Osteoporosis     Myalgia     Headache     Rotator cuff tendinitis, left     Persistent insomnia     Hair loss     Current medications reviewed as follows:  Current Outpatient Prescriptions on  File Prior to Visit   Medication Sig     acetaminophen (PAIN RELIEF) 650 MG CR tablet TAKE 2 TABLETS BY MOUTH THREE TIMES DAILY (Patient taking differently: TAKE 2 TABLETS BY MOUTH THREE TIMES DAILY. Pt usually takes 1-2 times per day.)     amLODIPine (NORVASC) 10 MG tablet Take 1 tablet (10 mg total) by mouth daily.     biotin 5,000 mcg TbDL Take 1 tablet by mouth daily.     CALCIUM CARBONATE/VITAMIN D3 (CALCIUM 600 + D,3, ORAL) Take 1,200 mg by mouth daily.     cholecalciferol, vitamin D3, (VITAMIN D3) 2,000 unit Tab TAKE 1 TABLET BY MOUTH EVERY DAY     moxifloxacin (VIGAMOX) 0.5 % ophthalmic solution INT 1 GTT IN OS FOUR TIMES DAILY. START 1 DAY B SURGERY AND U UNTIL BOTTLE IS FINISHED     pravastatin (PRAVACHOL) 40 MG tablet ALTERNATE 2 TABLETS MY MOUTH AND 1 TABLET EVERY OTHER DAY     prednisoLONE acetate (PRED-FORTE) 1 % ophthalmic suspension SHAKE LQ AND INT 1 GTT IN OS FOUR TIMES DAILY. START 1 DAY B SURGERY     predniSONE (DELTASONE) 20 MG tablet 80 mg po daily for 7 days, 60 p.o. daily for 7 days, 40 p.o. daily for 7 days, 20 p.o. daily for 7 days, 10 p.o. daily for 7 days then stop     [DISCONTINUED] traMADol (ULTRAM) 50 mg tablet TAKE 1 TO 2 TABLETS BY MOUTH EVERY 6 HOURS AS NEEDED FOR PAIN     lidocaine (LIDODERM) 5 % Apply to skin daily, leave in place for 12-18 hour and remove.  Repeat daily     traMADol (ULTRAM) 50 mg tablet Take 1 tablet (50 mg total) by mouth every 6 (six) hours as needed for pain.     [DISCONTINUED] ondansetron (ZOFRAN) 4 MG tablet Take 1 tablet (4 mg total) by mouth every 8 (eight) hours as needed for nausea.     [DISCONTINUED] pravastatin (PRAVACHOL) 40 MG tablet ALTERNATE 2 TABLETS BY MOUTH AND 1 TABLET EVERY OTHER DAY     No current facility-administered medications on file prior to visit.      History   Smoking Status     Current Every Day Smoker     Packs/day: 0.25   Smokeless Tobacco     Never Used     Social History     Social History Narrative     Patient Care  Team:  Wicho Aguayo MD as PCP - General  Liliam Salazar CMA as Clinic Care Coordination Care Guide (Clinic Care Coordination)  Briseyda Trujillo DO as Physician (General Surgery)  ROS  Full 10 system review including constitutional, respiratory, cardiac, gi, urinary, rheumatologic, neurologic, reproductive, dermatologic psychiatric is  performed (via questionnaire) and is negative         Objective  Physical Exam  Vitals:    10/25/18 1108   BP: 146/68   Patient Site: Right Arm   Patient Position: Sitting   Cuff Size: Adult Regular   Pulse: 96   Resp: 16   Weight: 129 lb 8 oz (58.7 kg)     Body mass index is 25.29 kg/(m^2).  Gen- alert, oriented/ appropriately responsive  HEENT- normal cephalic, atraumatic.   Chest- Normal inspiration and expiration.    Clear to ascultation.    No chest wall deformity or scar.  CV- Heart regular rate and rhythm  normal tones, no murmurs   No gallops or rubs.  Ext- appear well perfused, no edema  Skin- warm and dry,   no visualized rash    Diagnostics:   Results for orders placed or performed in visit on 10/25/18   Potassium   Result Value Ref Range    Potassium 3.9 3.5 - 5.0 mmol/L   Thyroid [place]   Result Value Ref Range    TSH 2.02 0.30 - 5.00 uIU/mL           Please note: Voice recognition software was used in this dictation.  It may therefore contain typographical errors.

## 2021-06-22 NOTE — PROGRESS NOTES
Spoke with Ogla Lidia and she made appointment with Dr. Aguayo regarding abnormal kidney function test results and also feeling generally unwell.

## 2021-06-22 NOTE — TELEPHONE ENCOUNTER
"Pt calling.  Pt was seen in clinic on 12/4/18 d/t follow-up for rotator cuff tendinitis on her left shoulder.  She received a subacromial injection in clinic.  Pt states she has been experiencing stiffness in her arm ever since injection.  Pt spoke to clinic on 12/8/18 and noted she thought there was improvement with her arm stiffness and pain at that time.  Pt states approximately a week after speaking to clinic she noticed increased pain and stiffness.   Rates pain when using arm a 10/10 and 6/10 at rest.  Pt states she is unable to move arm normally and has to use her right arm to help lift  Prescribed Tramadol for pain which gives her minimal to no relief for her pain.  Pt also states \"I know it is wrong but I have a friend who has some Vicodin and she gave me a couple pills\".    Pt states she would only take 1/2 Vicodin tablet at a time which did not help with pain either.    Pt states her children and grandchildren have expressed their concern for her pain level and her cognition.    PLAN  Advised Pt she should be seen in ED due to severe pain level.  Pt declines ED and states she would like to schedule an appt in clinic tomorrow.  Writer then noticed Pt does have an appt scheduled with Dr Alvarado in clinic tomorrow at 9:15 am.  Discussed home cares per protocol.  Aspen Felder, RN, [facility] RN Triage/Med Refills    Pt states she did not schedule appt and is unsure if a family member scheduled one for her.  Pt states she will keep scheduled appt for tomorrow and will call back if she needs to change appt time tomorrow.      Reason for Disposition    SEVERE pain (e.g., excruciating, unable to do any normal activities)    Protocols used: ARM PAIN-A-OH      "
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
## 2021-06-22 NOTE — PROGRESS NOTES
Please call patient and inform:  Hi Olga Lidia, all your labs have returned.  The biggest thing I see is a significant change in your kidney function over this last year.  I would follow up with Dr. Aguayo to discuss.  This could be causing some of your fatigue.  In addition your A1c has come up to 6.5.  So we might want to make some adjustments or work on some diet exercise changes.  No signs of urinary tract infection.  Your white count is also slightly elevated.  Have you felt like you have been sick recently?

## 2021-06-22 NOTE — TELEPHONE ENCOUNTER
Pt's friend Jaylyn calling to report pt had clinic appointment today and was unable to give a urine sample. Collection materials were sent home with pt. Order has been placed in Epic.    Advised Jaylyn sample can be dropped off at [facility] this weekend. (Verified with [facility] staff).     Jaylyn verbalizes understanding and agrees to plan.     Reason for Disposition    Nursing judgment or information in reference    Protocols used: NO GUIDELINE [id number]

## 2021-06-22 NOTE — PROGRESS NOTES
Olga Lidia comes in with a follow-up on rotator cuff tendinitis of left shoulder.  Previously failed physical therapy.  In July, I gave her a subacromial injection which had gave her nearly 5 months of pain relief.  She now has severe pain which is returned and requests another    Subacromial corticosteroid injection of left shoulder  Discussed risk and benefits.  -Risks being but not limited to to infection, bleeding and unlikely pneumothorax  -Benefit being limited to no more than 4 weeks of reduced shoulder pain.    Patient agreed/requested procedure  Identified region 1 cm inferior, 1 cm medial to posterior angle of the acromion  Prepped this with alcohol.  27-gauge 1-1/4 needle advanced parallel to the acromion, care taken not to drive the needle inferior  Injected 1 cc depomedrol 40 + 1 cc lidocaine  Patient tolerated the procedure well

## 2021-06-22 NOTE — TELEPHONE ENCOUNTER
----- Message from Cathleen Chaves DO sent at 1/7/2019  5:44 PM CST -----  Please call patient and inform:  Clifton Duggan, all your labs have returned.  The biggest thing I see is a significant change in your kidney function over this last year.  I would follow up with Dr. Aguayo to discuss.  This could be causing some of your fatigue.  In addition your A1c has come up to 6.5.  So we might want to make some adjustments or work on some diet exercise changes.  No signs of urinary tract infection.  Your white count is also slightly elevated.  Have you felt like you have been sick recently?
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
## 2021-06-23 NOTE — TELEPHONE ENCOUNTER
Talk to her at length today and I talked to her friend and that at length today, I do not think she needs results

## 2021-06-23 NOTE — TELEPHONE ENCOUNTER
Who is calling:  Olga Lidia  Reason for Call:  Same day lab appointment at 2:20  Date of last appointment with primary care:    Has the patient been recently seen:  Yes  Okay to leave a detailed message: Yes

## 2021-06-23 NOTE — TELEPHONE ENCOUNTER
CMT left detailed message to remind the patient that the test results as below were discussed at length in the office today.

## 2021-06-23 NOTE — TELEPHONE ENCOUNTER
Patient Returning Call  Reason for call:  Patient   Information relayed to patient:  Relayed information below   Patient has additional questions:  No  If YES, what are your questions/concerns:    Okay to leave a detailed message?: Yes

## 2021-06-23 NOTE — TELEPHONE ENCOUNTER
Test Results  Who is calling?:  Patient  Who ordered the test:  Dr. Augayo  Type of test: Lab  Date of test:  1/18/19  Where was the test performed:  Clinic  What are your questions/concerns?:  Patient will like to know if Dr. Aguayo will like to see her in clinic to discuss results or will a phone call regarding results be okay.  Please advise.  Okay to leave a detailed message?:  Yes     Patient states to call her friend, Beau, phone number at [phone].

## 2021-06-23 NOTE — TELEPHONE ENCOUNTER
Controlled Substance Refill Request  Medication:   Requested Prescriptions     Pending Prescriptions Disp Refills     traMADol (ULTRAM) 50 mg tablet [Pharmacy Med Name: TRAMADOL 50MG TABLETS] 150 tablet 0     Sig: TAKE 1 TO 2 TABLETS(50  MG) BY MOUTH EVERY 6 HOURS AS NEEDED FOR PAIN     Date Last Fill: 12/28/18  Pharmacy: [id number]   Submit electronically to pharmacy  Controlled Substance Agreement on File:   Encounter-Level CSA Scan Date:    There are no encounter-level csa scan date.       Last office visit: Last office visit pertaining to requested medication was 1/10/19.

## 2021-06-23 NOTE — TELEPHONE ENCOUNTER
----- Message from Wicho Aguayo MD sent at 1/24/2019  2:01 PM CST -----  Please let Olga Lidia know that I am still waiting for 1 more blood tests to come back and then I will get back to her.

## 2021-06-23 NOTE — TELEPHONE ENCOUNTER
Refill NOT Required -> merely re-transmittal indicating 90-day supplies.  Done now.    Medication was last renewed on 1/10/2019.    Ashley Younger, [facility] Triage/Med Refill 1/11/2019     Requested Prescriptions   Pending Prescriptions Disp Refills     potassium chloride (KLOR-CON) 10 MEQ CR tablet [Pharmacy Med Name: POTASSIUM CL 10MEQ ER TABLETS] 360 tablet 6     Sig: TAKE 2 TABLETS(20 MEQ) BY MOUTH TWICE DAILY    Potassium Supplements Refill Protocol Passed - 1/10/2019  6:37 PM       Passed - PCP or prescribing provider visit in past 12 months      Last office visit with prescriber/PCP: 1/10/2019 Wicho Aguayo MD OR same dept: 1/10/2019 Wicho Aguayo MD OR same specialty: 1/10/2019 Wicho Aguayo MD  Last physical: 2/9/2018 Last MTM visit: Visit date not found   Next visit within 3 mo: Visit date not found  Next physical within 3 mo: Visit date not found  Prescriber OR PCP: Wicho Aguayo MD  Last diagnosis associated with med order: There are no diagnoses linked to this encounter.  If protocol passes may refill for 12 months if within 3 months of last provider visit (or a total of 15 months).            Passed - Potassium level in last 12 months    Lab Results   Component Value Date    Potassium 3.8 01/10/2019

## 2021-06-23 NOTE — TELEPHONE ENCOUNTER
Patient was seen on January 4, 2019 @ 10:40am with Dr. Chaves and January 10 ,2019 at 2:40pm with Dr. Aguayo. Completing task.

## 2021-06-23 NOTE — TELEPHONE ENCOUNTER
FYI - Status Update  Who is Calling: Patient  Update: Patient questioning the status of the below refill request for Tramadol. Patient states she is out of medication. Please advise.  Okay to leave a detailed message?:  No return call needed

## 2021-06-23 NOTE — PROGRESS NOTES
"Olga Lidia is a 68-year-old with history of hypertension, long-term smoking, diet-controlled type 2 diabetes fairly recently diagnosed, osteoporosis.  2 or 3 years ago, she was hospitalized for acute kidney injury associated with sepsis.  Recently, 1/4/19 she saw my partner for \"feeling foggy\" which was reminiscent of sepsis and acute kidney injury upon that visit, she was noted to have a significant bump in her baseline creatinine to 1.76 (most recent had been 0.88 in February 2018), she had a low potassium of 3.3 and a slightly elevated calcium of 10.7 with a normal albumin of 3.8.  Urinalysis at that time was positive for only a few bacteria but a new finding of greater than 300 mg/dL of protein.    She saw me on follow-up of this visit on 1/10/19.  At that time her creatinine had improved to 1.03 and BUN was 15 but her calcium remained high.  It was 11.0 and ionized calcium was slightly elevated at 1.32.    She is not on a thiazide diuretic    Parathyroid was low normal at 25, semi-quantitative urine protein, microalbumin/creatinine ratio was 1834, showed an elevated alpha 2 fraction to suggest inflammation and a decreased gammaglobulin fraction.  Gammaglobulin percent was 6.1, normal 9-20, absolute gammaglobulin 0.4, normal 0.6-1.4.  Both vitamin D levels, dihydroxy and mono hydroxy were normal    She had a parathyroid hormone related protein level which was normal as well.      Her urine protein electrophoresis was \"unremarkable\"  I spoke with Dr. Patterson about the SPEP and he felt this was most likely inflammation rather than a bone marrow disorder  "

## 2021-06-23 NOTE — TELEPHONE ENCOUNTER
Who is calling:  Patient   Reason for Call:  Patient is very upset that Wicho Aguayo MD has not approved her refill. Patient stated she would like a covering provider to address her refill. Patient stated she has been out since yesterday. Patient was informed she can request a refill 3 business day prior to when her refill is due and patient verbalized this understanding. Please ask a covering provider. Patient wants this filled tonight.  Date of last appointment with primary care: 1/10/19  Has the patient been recently seen:  Yes  Okay to leave a detailed message: Yes  [phone]

## 2021-06-23 NOTE — TELEPHONE ENCOUNTER
Who is calling:  Patient  Reason for Call:  She is calling to see if labs are set up .  She was of the opinion she could come right in.  Writer did share they do want an appointment set up. Patient transferred to scheduling.   Date of last appointment with primary care: NA  Has the patient been recently seen:  NA  Okay to leave a detailed message: Yes

## 2021-06-23 NOTE — TELEPHONE ENCOUNTER
[facility] pharmacist called to check the status of this request. He states they have been requesting this since 1/25/19. Please expedite is possible.        Controlled Substance Refill Request  Medication Name:   Requested Prescriptions     Pending Prescriptions Disp Refills     traMADol (ULTRAM) 50 mg tablet [Pharmacy Med Name: TRAMADOL 50MG TABLETS] 150 tablet 0     Sig: TAKE 1 TO 2 TABLETS(50  MG) BY MOUTH EVERY 6 HOURS AS NEEDED FOR PAIN     Date Last Fill: 12/28/2018  Pharmacy: [facility] #[id number]      Submit electronically to pharmacy  Controlled Substance Agreement Date Scanned:   Encounter-Level CSA Scan Date:    There are no encounter-level csa scan date.       Last office visit with prescriber/PCP: Visit date not found OR same dept: 1/10/2019 Wicho Aguayo MD OR same specialty: 1/10/2019 Wicho Aguayo MD  Last physical: Visit date not found Last MTM visit: Visit date not found

## 2021-06-23 NOTE — PROGRESS NOTES
"Assessment/ Plan    Problem List Items Addressed This Visit        High    Essential hypertension     Controlled, amlodipine only            Unprioritized    Hypokalemia     Unclear reason for this, recheck, unable to swallow 20 mEq tablets, will change to 10 mEq         Relevant Orders    Basic Metabolic Panel    Leukocytosis, unspecified type     Recheck         Relevant Medications    meloxicam (MOBIC) 15 MG tablet    Other Relevant Orders    HM1(CBC and Differential)    HM1 (CBC with Diff)    Degenerative joint disease (DJD) of hip     Requiring pain medication.  Tramadol is probably best .  I voiced some concern that tramadol could be leading to sensation of foggy intellect.  However patient's been taking this for a long time and has not had problems and clearly needs something stronger than Tylenol and clearly nonsteroidal anti-inflammatories or not the answer right now.  So, for now, continue tramadol         MARCUS (acute kidney injury) (H) - Primary     Creatinine 0.88 2/9/18, 1.76 1/4/19  Patient feeling poorly, could be due to side effects of Depakote rather than renal problems.    Acute renal failure a few years ago when in hospital with sepsis, no dialysis, resolved back to normal renal function.    Started on meloxicam by orthopedics, taken 1 dose last night, obviously not related but instructed not to use nonsteroidals, not take meloxicam    Plan is to recheck, check FENA-if still abnormal, consider renal ultrasound  Encourage pushing fluids,           Relevant Orders    Microalbumin, Random Urine    Basic Metabolic Panel    Creatinine, Random Urine    Calcium, Ionized, Measured    Urinalysis-UC if Indicated    Sodium, Random Urine    Hypercalcemia     Borderline  Recheck with ionized calcium, further evaluation if needed         Relevant Orders    Basic Metabolic Panel    Memory changes     Changes are minimal, mostly subjective but \"spaciness\" has been noticed by family members.  See  " discussion last visit  Leading possibilities in my mind are the acute kidney injury and indeed, according to patient's friend, she had this last time when she had kidney problems.  More likely in my mind is the effects of Depakote as she started this medication about the time the problem began.  She is gone from 1 pill to 2 pills.  We will start by backing off to 1 pill of Depakote daily.               Subjective  CC:  Chief Complaint   Patient presents with     Follow-up     Labs - Kidney function      HPI:  68-year-old with history of hyperlipidemia, hypertension, nicotine dependence, diet-controlled type 2 diabetes, osteoporosis, chronic hip pain, was seen by  1/4/19.  She complained of difficulty with memory and pain.  She is not sleeping well, has had gradual onset of pain, kids are somewhat worried about her.  Lives with 2 adult sons and 2 grandsons.  Doubted that she was depressed.  Dr. Chaves decided to start with workup for metabolic problems and infections.  This was mainly normal with the exception of creatinine which jumped from 0.88 2/9/18 to 1.76 on the date it was drawn, BUN was normal at 21, potassium was low at 3.3 and calcium was noted to be high.  10.7.  Her A1c was slightly higher as well.    Patient continues to have similar sensation.  Looking back, it may have happened when she increased her dose of Depakote from 1 pill daily to 2 pills daily.  This was used to help treat her chronic headache.    Saw orthopedics yesterday for shoulder pain and was given prescription for meloxicam.  PFSH:  Patient Active Problem List   Diagnosis     Glaucoma     Hypokalemia     Leukocytosis, unspecified type     Hyperlipidemia     Nicotine Dependence     Essential hypertension     Trochanteric Bursitis     Controlled substance agreement signed     Health care maintenance     Degenerative joint disease (DJD) of hip     Acute blood loss anemia     Diet-controlled type 2 diabetes mellitus (H)      Osteoporosis     Myalgia     Headache     Rotator cuff tendinitis, left     Persistent insomnia     Hair loss     MARCUS (acute kidney injury) (H)     Hypercalcemia     Current medications reviewed as follows:  Current Outpatient Medications on File Prior to Visit   Medication Sig     acetaminophen (PAIN RELIEF) 650 MG CR tablet TAKE 2 TABLETS BY MOUTH THREE TIMES DAILY (Patient taking differently: TAKE 2 TABLETS BY MOUTH THREE TIMES DAILY. Pt usually takes 1-2 times per day.)     amitriptyline (ELAVIL) 10 MG tablet      amLODIPine (NORVASC) 10 MG tablet Take 1 tablet (10 mg total) by mouth daily.     aspirin 81 MG EC tablet Take 1 tablet (81 mg total) by mouth daily.     biotin 5,000 mcg TbDL Take 1 tablet by mouth daily.     brimonidine (ALPHAGAN) 0.2 % ophthalmic solution      CALCIUM CARBONATE/VITAMIN D3 (CALCIUM 600 + D,3, ORAL) Take 1,200 mg by mouth daily.     cholecalciferol, vitamin D3, (VITAMIN D3) 2,000 unit Tab TAKE 1 TABLET BY MOUTH EVERY DAY     codeine-guaiFENesin (GUAIFENESIN AC)  mg/5 mL liquid Take 10 mL by mouth 3 (three) times a day as needed for cough.     divalproex (DEPAKOTE ER) 250 MG 24 hour tablet Take 250 mg by mouth daily.     meloxicam (MOBIC) 15 MG tablet      moxifloxacin (VIGAMOX) 0.5 % ophthalmic solution INT 1 GTT IN OS FOUR TIMES DAILY. START 1 DAY B SURGERY AND U UNTIL BOTTLE IS FINISHED     pravastatin (PRAVACHOL) 40 MG tablet ALTERNATE 2 TABLETS MY MOUTH AND 1 TABLET EVERY OTHER DAY     prednisoLONE acetate (PRED-FORTE) 1 % ophthalmic suspension SHAKE LQ AND INT 1 GTT IN OS FOUR TIMES DAILY. START 1 DAY B SURGERY     predniSONE (DELTASONE) 20 MG tablet 80 mg po daily for 7 days, 60 p.o. daily for 7 days, 40 p.o. daily for 7 days, 20 p.o. daily for 7 days, 10 p.o. daily for 7 days then stop     traMADol (ULTRAM) 50 mg tablet TAKE 1 TO 2 TABLETS(50  MG) BY MOUTH EVERY 6 HOURS AS NEEDED FOR PAIN     [DISCONTINUED] KLOR-CON 20 mEq packet TAKE 1 PACKET BY MOUTH TWICE DAILY      [DISCONTINUED] potassium chloride (K-DUR,KLOR-CON) 20 MEQ tablet Take 1 tablet (20 mEq total) by mouth 2 (two) times a day.     [DISCONTINUED] potassium chloride 20 mEq TbER Take 1 tablet by mouth 2 (two) times a day.     No current facility-administered medications on file prior to visit.      Social History     Tobacco Use   Smoking Status Current Every Day Smoker     Packs/day: 0.25   Smokeless Tobacco Never Used     Social History     Social History Narrative     Not on file     Patient Care Team:  Wicho Aguayo MD as PCP - General  Liliam Salazar CMA as Clinic Care Coordination Care Guide (Clinic Care Coordination)  Briseyda Trujillo DO as Physician (General Surgery)  ROS  Full 10 system review including constitutional, respiratory, cardiac, gi, urinary, rheumatologic, neurologic, reproductive, dermatologic psychiatric is  performed  and is otherwise negative         Objective  Physical Exam  Vitals:    01/10/19 1449   BP: 152/90   Patient Site: Left Arm   Patient Position: Sitting   Cuff Size: Adult Regular   Pulse: 88   Resp: 16   Weight: 116 lb (52.6 kg)     Body mass index is 22.65 kg/m .  Gen- alert, oriented/ appropriately responsive  HEENT- normal cephalic, atraumatic.   Chest- Normal inspiration and expiration.    Clear to ascultation.    No chest wall deformity or scar.  CV- Heart regular rate and rhythm  normal tones, no murmurs   No gallops or rubs.  Ext- appear well perfused, no edema  Skin- warm and dry,   no visualized rash    Diagnostics:   Pending      Please note: Voice recognition software was used in this dictation.  It may therefore contain typographical errors.

## 2021-06-23 NOTE — TELEPHONE ENCOUNTER
Pharmacy called regarding this prescription. Patient states that she is out. Requesting to please fill ALONDRA Richmond RN [facility]

## 2021-06-24 NOTE — TELEPHONE ENCOUNTER
Controlled Substance Refill Request  Medication:   Requested Prescriptions     Pending Prescriptions Disp Refills     traMADol (ULTRAM) 50 mg tablet [Pharmacy Med Name: TRAMADOL 50MG TABLETS] 150 tablet 0     Sig: TAKE 1 TO 2 TABLETS(50  MG) BY MOUTH EVERY 6 HOURS AS NEEDED FOR PAIN     Date Last Fill: 1/30/19  [facility]   Submit electronically to pharmacy    Controlled Substance Agreement on File:   Encounter-Level CSA Scan Date:    There are no encounter-level csa scan date.       Last office visit with primary: 1/10/2019

## 2021-06-24 NOTE — PROGRESS NOTES
Patient Outreach:   Reason: Goal/action steps follow up    Attempt 1: Clinic Care Guide called patient today and no answer. Voicemail is not available. Unable to LM for pt to returning call. If this patient is returning my call, please transfer to [phone].    No upcoming appt schedule with [facility]/pcp at this time per appt desk/chart reviewed.    Plan:   Next outreach due week of 03/22/2019.

## 2021-07-03 NOTE — ADDENDUM NOTE
Addendum Note by Wicho Aguayo MD at 1/10/2019  2:40 PM     Author: Wicho Aguayo MD Service: -- Author Type: Physician    Filed: 1/11/2019  7:54 PM Encounter Date: 1/10/2019 Status: Signed    : Wicho Aguayo MD (Physician)    Addended by: WICHO AGUAYO on: 1/11/2019 07:54 PM        Modules accepted: Orders

## 2021-07-03 NOTE — ADDENDUM NOTE
Addendum Note by Wicho Aguayo MD at 4/10/2019  9:00 AM     Author: Wicho Aguayo MD Service: -- Author Type: Physician    Filed: 4/10/2019 12:56 PM Encounter Date: 4/10/2019 Status: Signed    : Wicho Aguayo MD (Physician)    Addended by: WICHO AGUAYO on: 4/10/2019 12:56 PM        Modules accepted: Orders

## 2021-07-13 ENCOUNTER — RECORDS - HEALTHEAST (OUTPATIENT)
Dept: ADMINISTRATIVE | Facility: CLINIC | Age: 71
End: 2021-07-13

## 2021-07-21 ENCOUNTER — RECORDS - HEALTHEAST (OUTPATIENT)
Dept: ADMINISTRATIVE | Facility: CLINIC | Age: 71
End: 2021-07-21

## 2024-10-08 NOTE — ASSESSMENT & PLAN NOTE
Creatinine 0.88 2/9/18, 1.76 1/4/19  Patient feeling poorly, could be due to side effects of Depakote rather than renal problems.    Acute renal failure a few years ago when in hospital with sepsis, no dialysis, resolved back to normal renal function.    Started on meloxicam by orthopedics, taken 1 dose last night, obviously not related but instructed not to use nonsteroidals, not take meloxicam    Plan is to recheck, check FENA-if still abnormal, consider renal ultrasound  Encourage pushing fluids,     Fall with Harm Risk